# Patient Record
Sex: FEMALE | Race: WHITE | URBAN - METROPOLITAN AREA
[De-identification: names, ages, dates, MRNs, and addresses within clinical notes are randomized per-mention and may not be internally consistent; named-entity substitution may affect disease eponyms.]

---

## 2017-03-22 ENCOUNTER — OUTPATIENT (OUTPATIENT)
Dept: OUTPATIENT SERVICES | Facility: HOSPITAL | Age: 57
LOS: 1 days | Discharge: HOME | End: 2017-03-22

## 2017-06-27 DIAGNOSIS — Z12.31 ENCOUNTER FOR SCREENING MAMMOGRAM FOR MALIGNANT NEOPLASM OF BREAST: ICD-10-CM

## 2018-03-26 ENCOUNTER — OUTPATIENT (OUTPATIENT)
Dept: OUTPATIENT SERVICES | Facility: HOSPITAL | Age: 58
LOS: 1 days | Discharge: HOME | End: 2018-03-26

## 2018-03-26 DIAGNOSIS — Z12.31 ENCOUNTER FOR SCREENING MAMMOGRAM FOR MALIGNANT NEOPLASM OF BREAST: ICD-10-CM

## 2019-04-20 ENCOUNTER — OUTPATIENT (OUTPATIENT)
Dept: OUTPATIENT SERVICES | Facility: HOSPITAL | Age: 59
LOS: 1 days | Discharge: HOME | End: 2019-04-20
Payer: MEDICAID

## 2019-04-20 DIAGNOSIS — Z12.31 ENCOUNTER FOR SCREENING MAMMOGRAM FOR MALIGNANT NEOPLASM OF BREAST: ICD-10-CM

## 2019-04-20 PROCEDURE — 77067 SCR MAMMO BI INCL CAD: CPT | Mod: 26

## 2019-04-20 PROCEDURE — 77063 BREAST TOMOSYNTHESIS BI: CPT | Mod: 26

## 2020-06-10 ENCOUNTER — OUTPATIENT (OUTPATIENT)
Dept: OUTPATIENT SERVICES | Facility: HOSPITAL | Age: 60
LOS: 1 days | Discharge: HOME | End: 2020-06-10
Payer: MEDICAID

## 2020-06-10 DIAGNOSIS — Z12.31 ENCOUNTER FOR SCREENING MAMMOGRAM FOR MALIGNANT NEOPLASM OF BREAST: ICD-10-CM

## 2020-06-10 PROCEDURE — 77063 BREAST TOMOSYNTHESIS BI: CPT | Mod: 26

## 2020-06-10 PROCEDURE — 77067 SCR MAMMO BI INCL CAD: CPT | Mod: 26

## 2021-06-16 ENCOUNTER — OUTPATIENT (OUTPATIENT)
Dept: OUTPATIENT SERVICES | Facility: HOSPITAL | Age: 61
LOS: 1 days | Discharge: HOME | End: 2021-06-16
Payer: MEDICAID

## 2021-06-16 DIAGNOSIS — Z12.31 ENCOUNTER FOR SCREENING MAMMOGRAM FOR MALIGNANT NEOPLASM OF BREAST: ICD-10-CM

## 2021-06-16 PROCEDURE — 77063 BREAST TOMOSYNTHESIS BI: CPT | Mod: 26

## 2021-06-16 PROCEDURE — 77067 SCR MAMMO BI INCL CAD: CPT | Mod: 26

## 2022-06-13 ENCOUNTER — OUTPATIENT (OUTPATIENT)
Dept: OUTPATIENT SERVICES | Facility: HOSPITAL | Age: 62
LOS: 1 days | Discharge: HOME | End: 2022-06-13
Payer: MEDICAID

## 2022-06-13 VITALS
HEART RATE: 70 BPM | DIASTOLIC BLOOD PRESSURE: 76 MMHG | TEMPERATURE: 98 F | HEIGHT: 67 IN | SYSTOLIC BLOOD PRESSURE: 145 MMHG | OXYGEN SATURATION: 100 % | WEIGHT: 205.03 LBS | RESPIRATION RATE: 18 BRPM

## 2022-06-13 DIAGNOSIS — Z01.818 ENCOUNTER FOR OTHER PREPROCEDURAL EXAMINATION: ICD-10-CM

## 2022-06-13 DIAGNOSIS — M20.41 OTHER HAMMER TOE(S) (ACQUIRED), RIGHT FOOT: ICD-10-CM

## 2022-06-13 DIAGNOSIS — Z98.890 OTHER SPECIFIED POSTPROCEDURAL STATES: Chronic | ICD-10-CM

## 2022-06-13 LAB
ALBUMIN SERPL ELPH-MCNC: 4.8 G/DL — SIGNIFICANT CHANGE UP (ref 3.5–5.2)
ALP SERPL-CCNC: 107 U/L — SIGNIFICANT CHANGE UP (ref 30–115)
ALT FLD-CCNC: 19 U/L — SIGNIFICANT CHANGE UP (ref 0–41)
ANION GAP SERPL CALC-SCNC: 14 MMOL/L — SIGNIFICANT CHANGE UP (ref 7–14)
APTT BLD: 31.5 SEC — SIGNIFICANT CHANGE UP (ref 27–39.2)
AST SERPL-CCNC: 22 U/L — SIGNIFICANT CHANGE UP (ref 0–41)
BASOPHILS # BLD AUTO: 0.07 K/UL — SIGNIFICANT CHANGE UP (ref 0–0.2)
BASOPHILS NFR BLD AUTO: 1.1 % — HIGH (ref 0–1)
BILIRUB SERPL-MCNC: 0.4 MG/DL — SIGNIFICANT CHANGE UP (ref 0.2–1.2)
BUN SERPL-MCNC: 15 MG/DL — SIGNIFICANT CHANGE UP (ref 10–20)
CALCIUM SERPL-MCNC: 9.7 MG/DL — SIGNIFICANT CHANGE UP (ref 8.5–10.1)
CHLORIDE SERPL-SCNC: 100 MMOL/L — SIGNIFICANT CHANGE UP (ref 98–110)
CO2 SERPL-SCNC: 26 MMOL/L — SIGNIFICANT CHANGE UP (ref 17–32)
CREAT SERPL-MCNC: 0.5 MG/DL — LOW (ref 0.7–1.5)
EGFR: 107 ML/MIN/1.73M2 — SIGNIFICANT CHANGE UP
EOSINOPHIL # BLD AUTO: 0.12 K/UL — SIGNIFICANT CHANGE UP (ref 0–0.7)
EOSINOPHIL NFR BLD AUTO: 1.9 % — SIGNIFICANT CHANGE UP (ref 0–8)
GLUCOSE SERPL-MCNC: 88 MG/DL — SIGNIFICANT CHANGE UP (ref 70–99)
HCT VFR BLD CALC: 43.3 % — SIGNIFICANT CHANGE UP (ref 37–47)
HGB BLD-MCNC: 13.9 G/DL — SIGNIFICANT CHANGE UP (ref 12–16)
IMM GRANULOCYTES NFR BLD AUTO: 0.3 % — SIGNIFICANT CHANGE UP (ref 0.1–0.3)
INR BLD: 1.01 RATIO — SIGNIFICANT CHANGE UP (ref 0.65–1.3)
LYMPHOCYTES # BLD AUTO: 1.84 K/UL — SIGNIFICANT CHANGE UP (ref 1.2–3.4)
LYMPHOCYTES # BLD AUTO: 28.4 % — SIGNIFICANT CHANGE UP (ref 20.5–51.1)
MCHC RBC-ENTMCNC: 26.5 PG — LOW (ref 27–31)
MCHC RBC-ENTMCNC: 32.1 G/DL — SIGNIFICANT CHANGE UP (ref 32–37)
MCV RBC AUTO: 82.5 FL — SIGNIFICANT CHANGE UP (ref 81–99)
MONOCYTES # BLD AUTO: 0.64 K/UL — HIGH (ref 0.1–0.6)
MONOCYTES NFR BLD AUTO: 9.9 % — HIGH (ref 1.7–9.3)
NEUTROPHILS # BLD AUTO: 3.78 K/UL — SIGNIFICANT CHANGE UP (ref 1.4–6.5)
NEUTROPHILS NFR BLD AUTO: 58.4 % — SIGNIFICANT CHANGE UP (ref 42.2–75.2)
NRBC # BLD: 0 /100 WBCS — SIGNIFICANT CHANGE UP (ref 0–0)
PLATELET # BLD AUTO: 235 K/UL — SIGNIFICANT CHANGE UP (ref 130–400)
POTASSIUM SERPL-MCNC: 3.9 MMOL/L — SIGNIFICANT CHANGE UP (ref 3.5–5)
POTASSIUM SERPL-SCNC: 3.9 MMOL/L — SIGNIFICANT CHANGE UP (ref 3.5–5)
PROT SERPL-MCNC: 7.4 G/DL — SIGNIFICANT CHANGE UP (ref 6–8)
PROTHROM AB SERPL-ACNC: 11.6 SEC — SIGNIFICANT CHANGE UP (ref 9.95–12.87)
RBC # BLD: 5.25 M/UL — SIGNIFICANT CHANGE UP (ref 4.2–5.4)
RBC # FLD: 14.5 % — SIGNIFICANT CHANGE UP (ref 11.5–14.5)
SODIUM SERPL-SCNC: 140 MMOL/L — SIGNIFICANT CHANGE UP (ref 135–146)
WBC # BLD: 6.47 K/UL — SIGNIFICANT CHANGE UP (ref 4.8–10.8)
WBC # FLD AUTO: 6.47 K/UL — SIGNIFICANT CHANGE UP (ref 4.8–10.8)

## 2022-06-13 PROCEDURE — 93010 ELECTROCARDIOGRAM REPORT: CPT

## 2022-06-13 PROCEDURE — 71046 X-RAY EXAM CHEST 2 VIEWS: CPT | Mod: 26

## 2022-06-13 NOTE — H&P PST ADULT - HISTORY OF PRESENT ILLNESS
Patient is a 61 year old male presenting to PAST in preparation for left cavus foot reconstruction including multiple tarsal bone osteotomies, peroneal tendon transfer, multiple metatarsal osteotomies with hammer toe correction one , two three four five, with hardware fixation and pop block on 6/27 under gen anesthesia by Dr. sorensen  Pt reports she has pain to b/ll feet . She will eventually have the right foot done as weel. States the pain is 5-7/10. She reports that althoug the feet hurt her she continues to stay active but rests when she finishes task  PATIENT CURRENTLY DENIES CHEST PAIN  SHORTNESS OF BREATH  PALPITATIONS,  DYSURIA, OR UPPER RESPIRATORY INFECTION IN PAST 2 WEEKS  EXERCISE  TOLERANCE  1-2 FLIGHT OF STAIRS  WITHOUT SHORTNESS OF BREATH    Anesthesia Alert  NO--Difficult Airway  NO--History of neck surgery or radiation  NO--Limited ROM of neck  NO--History of Malignant hyperthermia  NO--Personal or family history of Pseudocholinesterase deficiency  NO--Prior Anesthesia Complication  NO--Latex Allergy  NO--Loose teeth  NO--History of Rheumatoid Arthritis  NO--SLICK  NO-- BLEEDING RISK  NO--Other_____    As per patient, this is their complete medical and surgical history, including medications both prescribed or over the counter.  Patient verbalized understanding of instructions and was given the opportunity to ask questions and have them answered.  Patient denies any signs or symptoms of COVID 19 and denies contact with known positive individuals.  They have an appointment for  COVID testing pre-procedure and acknowledge its time and place.  They were instructed to quarantine pre-procedure, practice exposure control measures, continue to self-monitor and report any concerns to their proceduralist.

## 2022-06-13 NOTE — H&P PST ADULT - SPIRITUAL CULTURAL, CURRENT SITUATION, PROFILE
Impression: Age-related hypermature cataract of right eye: H25.21. Plan: refer for  cat claudette, pt had Bscan done in April and post seg was normal according to pt. Records will be scanned.
Impression: Blindness in one eye: H54.40.  Plan: complications from cataract surgery OS
n

## 2022-06-21 ENCOUNTER — OUTPATIENT (OUTPATIENT)
Dept: OUTPATIENT SERVICES | Facility: HOSPITAL | Age: 62
LOS: 1 days | Discharge: HOME | End: 2022-06-21
Payer: MEDICAID

## 2022-06-21 DIAGNOSIS — Z98.890 OTHER SPECIFIED POSTPROCEDURAL STATES: Chronic | ICD-10-CM

## 2022-06-21 DIAGNOSIS — Z12.31 ENCOUNTER FOR SCREENING MAMMOGRAM FOR MALIGNANT NEOPLASM OF BREAST: ICD-10-CM

## 2022-06-21 PROBLEM — H40.9 UNSPECIFIED GLAUCOMA: Chronic | Status: ACTIVE | Noted: 2022-06-13

## 2022-06-21 PROBLEM — E78.00 PURE HYPERCHOLESTEROLEMIA, UNSPECIFIED: Chronic | Status: ACTIVE | Noted: 2022-06-13

## 2022-06-21 PROBLEM — I10 ESSENTIAL (PRIMARY) HYPERTENSION: Chronic | Status: ACTIVE | Noted: 2022-06-13

## 2022-06-21 PROCEDURE — 77067 SCR MAMMO BI INCL CAD: CPT | Mod: 26

## 2022-06-21 PROCEDURE — 77063 BREAST TOMOSYNTHESIS BI: CPT | Mod: 26

## 2022-06-27 ENCOUNTER — TRANSCRIPTION ENCOUNTER (OUTPATIENT)
Age: 62
End: 2022-06-27

## 2022-06-27 ENCOUNTER — RESULT REVIEW (OUTPATIENT)
Age: 62
End: 2022-06-27

## 2022-06-27 ENCOUNTER — INPATIENT (INPATIENT)
Facility: HOSPITAL | Age: 62
LOS: 0 days | Discharge: HOME | End: 2022-06-28
Attending: INTERNAL MEDICINE | Admitting: INTERNAL MEDICINE
Payer: MEDICAID

## 2022-06-27 VITALS
OXYGEN SATURATION: 100 % | TEMPERATURE: 99 F | SYSTOLIC BLOOD PRESSURE: 151 MMHG | HEIGHT: 67 IN | WEIGHT: 205.03 LBS | DIASTOLIC BLOOD PRESSURE: 70 MMHG | RESPIRATION RATE: 17 BRPM | HEART RATE: 72 BPM

## 2022-06-27 DIAGNOSIS — Z98.890 OTHER SPECIFIED POSTPROCEDURAL STATES: Chronic | ICD-10-CM

## 2022-06-27 PROCEDURE — 73620 X-RAY EXAM OF FOOT: CPT | Mod: 26,RT

## 2022-06-27 PROCEDURE — 99221 1ST HOSP IP/OBS SF/LOW 40: CPT

## 2022-06-27 PROCEDURE — 88304 TISSUE EXAM BY PATHOLOGIST: CPT | Mod: 26

## 2022-06-27 PROCEDURE — 88311 DECALCIFY TISSUE: CPT | Mod: 26

## 2022-06-27 RX ORDER — SIMVASTATIN 20 MG/1
20 TABLET, FILM COATED ORAL AT BEDTIME
Refills: 0 | Status: DISCONTINUED | OUTPATIENT
Start: 2022-06-27 | End: 2022-06-28

## 2022-06-27 RX ORDER — LEVOTHYROXINE SODIUM 125 MCG
75 TABLET ORAL
Refills: 0 | Status: DISCONTINUED | OUTPATIENT
Start: 2022-06-27 | End: 2022-06-28

## 2022-06-27 RX ORDER — HYDROCHLOROTHIAZIDE 25 MG
25 TABLET ORAL DAILY
Refills: 0 | Status: DISCONTINUED | OUTPATIENT
Start: 2022-06-27 | End: 2022-06-28

## 2022-06-27 RX ORDER — LATANOPROST 0.05 MG/ML
1 SOLUTION/ DROPS OPHTHALMIC; TOPICAL AT BEDTIME
Refills: 0 | Status: DISCONTINUED | OUTPATIENT
Start: 2022-06-27 | End: 2022-06-28

## 2022-06-27 RX ORDER — ACETAMINOPHEN 500 MG
650 TABLET ORAL EVERY 6 HOURS
Refills: 0 | Status: DISCONTINUED | OUTPATIENT
Start: 2022-06-27 | End: 2022-06-28

## 2022-06-27 RX ORDER — METOPROLOL TARTRATE 50 MG
25 TABLET ORAL DAILY
Refills: 0 | Status: DISCONTINUED | OUTPATIENT
Start: 2022-06-27 | End: 2022-06-28

## 2022-06-27 RX ORDER — SODIUM CHLORIDE 9 MG/ML
1000 INJECTION, SOLUTION INTRAVENOUS
Refills: 0 | Status: DISCONTINUED | OUTPATIENT
Start: 2022-06-27 | End: 2022-06-28

## 2022-06-27 RX ORDER — HYDROMORPHONE HYDROCHLORIDE 2 MG/ML
0.5 INJECTION INTRAMUSCULAR; INTRAVENOUS; SUBCUTANEOUS EVERY 4 HOURS
Refills: 0 | Status: DISCONTINUED | OUTPATIENT
Start: 2022-06-27 | End: 2022-06-28

## 2022-06-27 RX ORDER — HYDROMORPHONE HYDROCHLORIDE 2 MG/ML
0.5 INJECTION INTRAMUSCULAR; INTRAVENOUS; SUBCUTANEOUS ONCE
Refills: 0 | Status: DISCONTINUED | OUTPATIENT
Start: 2022-06-27 | End: 2022-06-28

## 2022-06-27 RX ADMIN — SODIUM CHLORIDE 100 MILLILITER(S): 9 INJECTION, SOLUTION INTRAVENOUS at 22:20

## 2022-06-27 NOTE — BRIEF OPERATIVE NOTE - NSICDXBRIEFPOSTOP_GEN_ALL_CORE_FT
POST-OP DIAGNOSIS:  Hammer toe of right foot 27-Jun-2022 22:16:07 Right foot pes cavus w/ hammer toe 1-5 Margaret Lewis  Pes cavus 27-Jun-2022 22:16:23 Right foot pes cavus w/ hammer toe 1-5 Margaret Lewis

## 2022-06-27 NOTE — BRIEF OPERATIVE NOTE - COMMENTS
Pt will be admitted for a day for post op monitoring and pain control; Per attending, request 900 mg Clinda q8 twice dose for post op abx;

## 2022-06-27 NOTE — PACU DISCHARGE NOTE - COMMENTS
61 y o female S/P Right Cavus Reconstruction, Multiple Tarsal Bone Osteotomies, Multiple Metatarsal Osteotomies, Hammertoe Correction, 1, 2, 3, 4 Hardware Fixation. Right Popliteal Sciatic Block and IV Sedation without complications. VS /82 HR 80 RR 16 SAO2 97%. Pt tolerated procedure well.

## 2022-06-27 NOTE — H&P ADULT - HISTORY OF PRESENT ILLNESS
62yo female is admitted to medicine following extensive podiatry surgery for post op management  62yo female is admitted to medicine following extensive podiatry surgery for post op management. Currently feels well and wants to avoid heavy analgesics

## 2022-06-27 NOTE — BRIEF OPERATIVE NOTE - NSICDXBRIEFPREOP_GEN_ALL_CORE_FT
PRE-OP DIAGNOSIS:  Pes cavus 27-Jun-2022 22:15:40 Right foot pes cavus w/ hammer toe 1-5 Margaret Lewis  Hammer toe of right foot 27-Jun-2022 22:15:54 Right foot pes cavus w/ hammer toe 1-5 Margaret Lewis

## 2022-06-27 NOTE — H&P ADULT - ASSESSMENT
Post op management - for now analgesics prn, 2 doses of cleocin IV fluids and monitor  Post op management following podiatry surgery- for now analgesics prn, 2 doses of cleocin per podiatry request, IV fluids and monitor     HTN- monitor on current meds    HLD - zocor    hypothyroid -on replacement     Glaucoma - continue usual drops

## 2022-06-27 NOTE — BRIEF OPERATIVE NOTE - NSICDXBRIEFPROCEDURE_GEN_ALL_CORE_FT
PROCEDURES:  Plantar fasciotomy 27-Jun-2022 22:08:41 Right foot pes cavus w/ hammer toe 1-5 Margaret Lewis  Osteotomy, calcaneus, Bhardwaj 27-Jun-2022 22:08:53 Right foot pes cavus w/ hammer toe 1-5 Debbie, Margaret Dunne  Plantarflexory osteotomy 27-Jun-2022 22:09:01 Right foot pes cavus w/ hammer toe 1-5 Debbie, Margaret Dunne  Luna procedure, great toe 27-Jun-2022 22:09:25 Right foot pes cavus w/ hammer toe 1-5 Debbie, Margaret Dunne  Arthrodesis of third toe 27-Jun-2022 22:09:45 Right foot pes cavus w/ hammer toe 1-5 Debbie, Margaret Tarustam  Arthrodesis of fourth toe 27-Jun-2022 22:09:53 Right foot pes cavus w/ hammer toe 1-5 Debbie, Sudheeri Tae  Arthrodesis of second toe 27-Jun-2022 22:10:01 Right foot pes cavus w/ hammer toe 1-5 Debbie, Margaret Tae  Arthrodesis of right great toe 27-Jun-2022 22:10:32 Right foot pes cavus w/ hammer toe 1-5 Debbie, Margaret Dunne  Arthroplasty of toe of right foot 27-Jun-2022 22:13:22 Right foot pes cavus w/ hammer toe 1-5 Debbie, Margaret Dunne

## 2022-06-27 NOTE — CHART NOTE - NSCHARTNOTEFT_GEN_A_CORE
PACU ANESTHESIA ADMISSION NOTE      Procedure: Right Cavus Foot Reconstruction, Multiple Tarsal Bone Osteotomies, Multiple Metatarsal Osteotomies with Hammertoe Correction, 1, 2, 3, 4 Hardware Fixation  Post op diagnosis: Right Foot Multiple Hammertoes    ____  Intubated  TV:______       Rate: ______      FiO2: ______    __x__  Patent Airway    ___x_  Full return of protective reflexes    __x__  Full recovery from anesthesia / back to baseline     Vitals:   T: 98          R:  16                BP:   116/61               Sat: 98%                   P: 90      Mental Status:  __x__ Awake   ___x__ Alert   _____ Drowsy   _____ Sedated    Nausea/Vomiting:  __x__ NO  ______Yes,   See Post - Op Orders          Pain Scale (0-10):  _____    Treatment: ____ None    __x__ See Post - Op/PCA Orders    Post - Operative Fluids:   ____ Oral   __x__ See Post - Op Orders    Plan: Discharge:   ____Home       __x___Floor     _____Critical Care    _____  Other:_________________

## 2022-06-28 ENCOUNTER — TRANSCRIPTION ENCOUNTER (OUTPATIENT)
Age: 62
End: 2022-06-28

## 2022-06-28 VITALS
TEMPERATURE: 98 F | SYSTOLIC BLOOD PRESSURE: 100 MMHG | RESPIRATION RATE: 16 BRPM | HEART RATE: 78 BPM | DIASTOLIC BLOOD PRESSURE: 64 MMHG

## 2022-06-28 LAB
ALBUMIN SERPL ELPH-MCNC: 4.5 G/DL — SIGNIFICANT CHANGE UP (ref 3.5–5.2)
ALP SERPL-CCNC: 95 U/L — SIGNIFICANT CHANGE UP (ref 30–115)
ALT FLD-CCNC: 19 U/L — SIGNIFICANT CHANGE UP (ref 0–41)
ANION GAP SERPL CALC-SCNC: 14 MMOL/L — SIGNIFICANT CHANGE UP (ref 7–14)
AST SERPL-CCNC: 24 U/L — SIGNIFICANT CHANGE UP (ref 0–41)
BILIRUB SERPL-MCNC: 0.6 MG/DL — SIGNIFICANT CHANGE UP (ref 0.2–1.2)
BUN SERPL-MCNC: 15 MG/DL — SIGNIFICANT CHANGE UP (ref 10–20)
CALCIUM SERPL-MCNC: 9.6 MG/DL — SIGNIFICANT CHANGE UP (ref 8.5–10.1)
CHLORIDE SERPL-SCNC: 100 MMOL/L — SIGNIFICANT CHANGE UP (ref 98–110)
CO2 SERPL-SCNC: 24 MMOL/L — SIGNIFICANT CHANGE UP (ref 17–32)
CREAT SERPL-MCNC: 0.6 MG/DL — LOW (ref 0.7–1.5)
EGFR: 103 ML/MIN/1.73M2 — SIGNIFICANT CHANGE UP
GLUCOSE SERPL-MCNC: 111 MG/DL — HIGH (ref 70–99)
HCT VFR BLD CALC: 39.7 % — SIGNIFICANT CHANGE UP (ref 37–47)
HGB BLD-MCNC: 13.2 G/DL — SIGNIFICANT CHANGE UP (ref 12–16)
MCHC RBC-ENTMCNC: 27.3 PG — SIGNIFICANT CHANGE UP (ref 27–31)
MCHC RBC-ENTMCNC: 33.2 G/DL — SIGNIFICANT CHANGE UP (ref 32–37)
MCV RBC AUTO: 82.2 FL — SIGNIFICANT CHANGE UP (ref 81–99)
NRBC # BLD: 0 /100 WBCS — SIGNIFICANT CHANGE UP (ref 0–0)
PLATELET # BLD AUTO: 269 K/UL — SIGNIFICANT CHANGE UP (ref 130–400)
POTASSIUM SERPL-MCNC: 3.9 MMOL/L — SIGNIFICANT CHANGE UP (ref 3.5–5)
POTASSIUM SERPL-SCNC: 3.9 MMOL/L — SIGNIFICANT CHANGE UP (ref 3.5–5)
PROT SERPL-MCNC: 7 G/DL — SIGNIFICANT CHANGE UP (ref 6–8)
RBC # BLD: 4.83 M/UL — SIGNIFICANT CHANGE UP (ref 4.2–5.4)
RBC # FLD: 14.5 % — SIGNIFICANT CHANGE UP (ref 11.5–14.5)
SODIUM SERPL-SCNC: 138 MMOL/L — SIGNIFICANT CHANGE UP (ref 135–146)
WBC # BLD: 14.35 K/UL — HIGH (ref 4.8–10.8)
WBC # FLD AUTO: 14.35 K/UL — HIGH (ref 4.8–10.8)

## 2022-06-28 PROCEDURE — 99239 HOSP IP/OBS DSCHRG MGMT >30: CPT

## 2022-06-28 RX ORDER — ACETAMINOPHEN 500 MG
2 TABLET ORAL
Qty: 0 | Refills: 0 | DISCHARGE
Start: 2022-06-28

## 2022-06-28 RX ADMIN — Medication 100 MILLIGRAM(S): at 00:44

## 2022-06-28 RX ADMIN — Medication 100 MILLIGRAM(S): at 14:31

## 2022-06-28 RX ADMIN — Medication 75 MICROGRAM(S): at 06:00

## 2022-06-28 RX ADMIN — Medication 100 MILLIGRAM(S): at 08:45

## 2022-06-28 RX ADMIN — Medication 25 MILLIGRAM(S): at 05:55

## 2022-06-28 RX ADMIN — Medication 25 MILLIGRAM(S): at 05:54

## 2022-06-28 NOTE — CONSULT NOTE ADULT - ASSESSMENT
IMPRESSION: Rehab of 61 y.o f rehab  for gd sp rt foot sx  hummer toe repair     PRECAUTIONS: [  ] Cardiac  [  ] Respiratory  [  ] Seizures [  ] Contact Isolation  [  ] Droplet Isolation  [ FALL ] Other    Weight Bearing Status:     RECOMMENDATION:    Out of Bed to Chair     DVT/Decubiti Prophylaxis    REHAB PLAN:     [  x ] Bedside P/T 3-5 times a week   [   ]   Bedside O/T  2-3 times a week             [   ] No Rehab Therapy Indicated                   [   ]  Speech Therapy   Conditioning/ROM                                    ADL  Bed Mobility                                               Conditioning/ROM  Transfers                                                     Bed Mobility  Sitting /Standing Balance                         Transfers                                        Gait Training                                               Sitting/Standing Balance  Stair Training [   ]Applicable                    Home equipment Eval                                                                        Splinting  [   ] Only      GOALS:   ADL   [ x  ]   Independent                    Transfers  [x   ] Independent                          Ambulation  [ x  ] Independent     [ x   ] With device                            [   ]  CG                                                         [   ]  CG                                                                  [   ] CG                            [    ] Min A                                                   [   ] Min A                                                              [   ] Min  A          DISCHARGE PLAN:   [   ]  Good candidate for Intensive Rehabilitation/Hospital based-[facility]                                             Will tolerate 3hrs Intensive Rehab Daily                                       [    ]  Short Term Rehab in Skilled Nursing Facility                                       [   xx ]  Home with Outpatient or VN services                                         [    ]  Possible Candidate for Intensive Hospital based Rehab

## 2022-06-28 NOTE — PROGRESS NOTE ADULT - SUBJECTIVE AND OBJECTIVE BOX
Podiatry Progress Note    Subjective:   GALA FOWLER is a pleasant well-nourished, well developed 61y Female in no acute distress, alert awake, and oriented to person, place and time.  Patient is a 61y old  Female who presents with a chief complaint of Right Foot Sx. Pt resting well in recliner; States she is happy w/results of Sx; No new pedal complaints.    PAST MEDICAL & SURGICAL HISTORY:  HTN (hypertension)      Hypercholesteremia      Glaucoma      History of surgery  tonsillectomy           Objective:  Vital Signs Last 24 Hrs  T(C): 36.7 (28 Jun 2022 12:53), Max: 36.7 (27 Jun 2022 21:57)  T(F): 98.1 (28 Jun 2022 12:53), Max: 98.1 (28 Jun 2022 04:30)  HR: 78 (28 Jun 2022 12:53) (71 - 88)  BP: 100/64 (28 Jun 2022 12:53) (100/64 - 144/84)  BP(mean): --  RR: 16 (28 Jun 2022 12:53) (16 - 20)  SpO2: 98% (28 Jun 2022 06:00) (96% - 99%)                        13.2   14.35 )-----------( 269      ( 28 Jun 2022 12:04 )             39.7                 06-28    138  |  100  |  15  ----------------------------<  111<H>  3.9   |  24  |  0.6<L>    Ca    9.6      28 Jun 2022 12:04    TPro  7.0  /  Alb  4.5  /  TBili  0.6  /  DBili  x   /  AST  24  /  ALT  19  /  AlkPhos  95  06-28        Assessment:  s/p R foot pes cavus and hammer toe 1-5 correction; Plantar fasciotomy, Bhardwaj procedure, Plantarflexory osteotomy, Ramón and Jeremy tendon transfer, arthrodesis of toe 1-4 w/ implants, Z-skinplasty of right 5th toe;     Plan:  Chart reviewed and Patient evaluated. All Questions and Concerns Addressed and Answered  Discussed diagnosis and treatment with patient  s/p R foot pes cavus and hammer toe 1-5 correction; Plantar fasciotomy, Bhardwaj procedure, Plantarflexory osteotomy, Ramón and Jeremy tendon transfer, arthrodesis of toe 1-4 w/ implants, Z-skinplasty of right 5th toe;   Pt needs to be monitor for a day for pain control and post op monitoring per attending;   Dr. Malone requesting Clindamycin 900 mg q8, two dose for post op abx; Abx completed 6/28/22;  Patient Stable for DSC from Podiatry Standpoint;  Patient can f/u w/Dr. Malone in o/p Podiatry Clinic 1 week post DSC;  No further intervention per Podiatry;  Weight bearing status; Strict Non-weight bearing Right Foot;  Discussed Plan w/ Dr. Malone;       Podiatry      Podiatry Progress Note    Subjective:   GALA FOWLER is a pleasant well-nourished, well developed 61y Female in no acute distress, alert awake, and oriented to person, place and time.  Patient is a 61y old  Female who presents with a chief complaint of Right Foot Sx. Pt resting well in recliner; States she is happy w/results of Sx; No new pedal complaints. Dressings D/C/I.     PAST MEDICAL & SURGICAL HISTORY:  HTN (hypertension)      Hypercholesteremia      Glaucoma      History of surgery  tonsillectomy           Objective:  Vital Signs Last 24 Hrs  T(C): 36.7 (28 Jun 2022 12:53), Max: 36.7 (27 Jun 2022 21:57)  T(F): 98.1 (28 Jun 2022 12:53), Max: 98.1 (28 Jun 2022 04:30)  HR: 78 (28 Jun 2022 12:53) (71 - 88)  BP: 100/64 (28 Jun 2022 12:53) (100/64 - 144/84)  BP(mean): --  RR: 16 (28 Jun 2022 12:53) (16 - 20)  SpO2: 98% (28 Jun 2022 06:00) (96% - 99%)                        13.2   14.35 )-----------( 269      ( 28 Jun 2022 12:04 )             39.7                 06-28    138  |  100  |  15  ----------------------------<  111<H>  3.9   |  24  |  0.6<L>    Ca    9.6      28 Jun 2022 12:04    TPro  7.0  /  Alb  4.5  /  TBili  0.6  /  DBili  x   /  AST  24  /  ALT  19  /  AlkPhos  95  06-28        Assessment:  s/p R foot pes cavus and hammer toe 1-5 correction; Plantar fasciotomy, Bhardwaj procedure, Plantarflexory osteotomy, Ramón and Luna tendon transfer, arthrodesis of toe 1-4 w/ implants, Z-skinplasty of right 5th toe;     Plan:  Chart reviewed and Patient evaluated. All Questions and Concerns Addressed and Answered  Discussed diagnosis and treatment with patient  s/p R foot pes cavus and hammer toe 1-5 correction; Plantar fasciotomy, Bhardwaj procedure, Plantarflexory osteotomy, Ramón and Luna tendon transfer, arthrodesis of toe 1-4 w/ implants, Z-skinplasty of right 5th toe;   Pt needs to be monitor for a day for pain control and post op monitoring per attending;   Dr. Malone requesting Clindamycin 900 mg q8, two dose for post op abx; Abx completed 6/28/22;  Patient Stable for DSC from Podiatry Standpoint;  Patient can f/u w/Dr. Malone in o/p Podiatry Clinic 1 week post DSC;  No further intervention per Podiatry;  Weight bearing status; Strict Non-weight bearing Right Foot;  Discussed Plan w/ Dr. Malone;       Podiatry

## 2022-06-28 NOTE — CONSULT NOTE ADULT - SUBJECTIVE AND OBJECTIVE BOX
Podiatry Consult Note    Subjective:  GALA FOWLER is a 61y old  Female who presents with a chief complaint of s/p R foot pes cavus and hammer toe 1-5 correction;   HPI:  Pt f/u w/ Dr. Malone, s/p R foot pes cavus and hammer toe 1-5 correction; pt needs to be monitored for a day for post op abx and pain control, Dr. Malone requested pt to be admitted to [facility] for a day;     Past Medical History and Surgical History  PAST MEDICAL & SURGICAL HISTORY:  HTN (hypertension)  Hypercholesteremia  Glaucoma  History of surgery  tonsillectomy    Objective:  Vital Signs Last 24 Hrs  T(C): 36.7 (27 Jun 2022 21:57), Max: 37.1 (27 Jun 2022 13:55)  T(F): 98 (27 Jun 2022 21:57), Max: 98.7 (27 Jun 2022 13:55)  HR: 76 (27 Jun 2022 22:07) (72 - 88)  BP: 144/84 (27 Jun 2022 22:07) (116/61 - 151/70)  BP(mean): --  RR: 20 (27 Jun 2022 22:07) (17 - 20)  SpO2: 98% (27 Jun 2022 22:07) (96% - 100%)                Assessment:  s/p R foot pes cavus and hammer toe 1-5 correction; Plantar fasciotomy, Bhardwaj procedure, Plantarflexory osteotomy, Ramón and Luna tendon transfer, arthrodesis of toe 1-4 w/ implants, Z-skinplasty of right 5th toe;     Plan:  Chart reviewed and Patient evaluated. All Questions and Concerns Addressed and Answered  Discussed diagnosis and treatment with patient  s/p R foot pes cavus and hammer toe 1-5 correction; Plantar fasciotomy, Bhardwaj procedure, Plantarflexory osteotomy, Ramón and Luna tendon transfer, arthrodesis of toe 1-4 w/ implants, Z-skinplasty of right 5th toe;   Pt needs to be monitor for a day for pain control and post op monitoring per attending;   Dr. Malone requesting Clindamycin 900 mg q8, two dose for post op abx;   Per Dr. Malone, pt is stable to be discharge home once pt receives Clindamycin 900mg q8 two dose;   Weight bearing status; strict non-weight bearing to right foot;   Discussed Plan w/ Dr. Malone;     Podiatry       
HPI: 62 yo female is admitted to medicine following extensive podiatry surgery for post op management. Currently feels well and wants to avoid heavy analgesics ptn referred  to  acute  rehab  for eval and  tx  ptn seen and exam at  bed  side pleasant  to  talk  to  her no new c.o  aox3     PTN  REFERRED TO ACUTE  REHAB  FOR  EVAL AND  TX   PAST MEDICAL & SURGICAL HISTORY:  HTN (hypertension)      Hypercholesteremia      Glaucoma      History of surgery  tonsillectomy          Hospital Course:    TODAY'S SUBJECTIVE & REVIEW OF SYMPTOMS:     Constitutional WNL   Cardio WNL   Resp WNL   GI WNL  Heme WNL  Endo WNL  Skin WNL  MSK WNL  Neuro WNL  Cognitive WNL  Psych WNL      MEDICATIONS  (STANDING):  clindamycin IVPB      clindamycin IVPB 900 milliGRAM(s) IV Intermittent every 8 hours  hydrochlorothiazide 25 milliGRAM(s) Oral daily  lactated ringers. 1000 milliLiter(s) (100 mL/Hr) IV Continuous <Continuous>  latanoprost 0.005% Ophthalmic Solution 1 Drop(s) Both EYES at bedtime  levothyroxine 75 MICROGram(s) Oral <User Schedule>  metoprolol succinate ER 25 milliGRAM(s) Oral daily  simvastatin 20 milliGRAM(s) Oral at bedtime    MEDICATIONS  (PRN):  acetaminophen     Tablet .. 650 milliGRAM(s) Oral every 6 hours PRN Mild Pain (1 - 3)  HYDROmorphone  Injectable 0.5 milliGRAM(s) IV Push once PRN Severe Pain (7 - 10)  HYDROmorphone  Injectable 0.5 milliGRAM(s) IV Push every 4 hours PRN Moderate Pain (4 - 6)      FAMILY HISTORY:  Known health problems: none (Father, Mother)        Allergies    penicillins (Rash)    Intolerances        SOCIAL HISTORY:    [  ] Etoh  [  ] Smoking  [  ] Substance abuse     Home Environment:  [  ] Home Alone  [ x ] Lives with Family  [  ] Home Health Aid    Dwelling:  [  ] Apartment  [x  ] Private House  [  ] Adult Home  [  ] Skilled Nursing Facility      [  ] Short Term  [  ] Long Term  [ x ] Stairs       Elevator [  ]    FUNCTIONAL STATUS PTA: (Check all that apply)  Ambulation: [ x  ]Independent    [  ] Dependent     [  ] Non-Ambulatory  Assistive Device: [  ] SA Cane  [  ]  Q Cane  [  ] Walker  [  ]  Wheelchair  ADL : [ x ] Independent  [  ]  Dependent       Vital Signs Last 24 Hrs  T(C): 36.7 (28 Jun 2022 04:30), Max: 37.1 (27 Jun 2022 13:55)  T(F): 98.1 (28 Jun 2022 04:30), Max: 98.7 (27 Jun 2022 13:55)  HR: 76 (28 Jun 2022 04:30) (71 - 88)  BP: 125/64 (28 Jun 2022 04:30) (116/61 - 151/70)  BP(mean): --  RR: 17 (28 Jun 2022 04:30) (17 - 20)  SpO2: 98% (28 Jun 2022 06:00) (96% - 100%)      PHYSICAL EXAM: Alert & Oriented X3  GENERAL: NAD, well-groomed, well-developed  HEAD:  Atraumatic, Normocephalic  EYES: EOMI, PERRLA, conjunctiva and sclera clear  NECK: Supple, No JVD, Normal thyroid  CHEST/LUNG: Clear to percussion bilaterally; No rales, rhonchi, wheezing, or rubs  HEART: Regular rate and rhythm; No murmurs, rubs, or gallops  ABDOMEN: Soft, Nontender, Nondistended; Bowel sounds present  EXTREMITIES:  2+ Peripheral Pulses, No clubbing, cyanosis, or edema    NERVOUS SYSTEM:  Cranial Nerves 2-12 intact [ x ] Abnormal  [  ]  ROM: WFL all extremities [ x ]  Abnormal [  ]  Motor Strength: WFL all extremities  [  ]  Abnormal [rt  le  in  caste   ]  Sensation: intact to light touch [x  ] Abnormal [  ]  Reflexes: Symmetric [x  ]  Abnormal [  ]    FUNCTIONAL STATUS:  Bed Mobility: Independent [  ]  Supervision [  ]  Needs Assistance [ x ]  N/A [  ]  Transfers: Independent [  ]  Supervision [  ]  Needs Assistance [x  ]  N/A [  ]   Ambulation: Independent [  ]  Supervision [  ]  Needs Assistance [x  ]  N/A [  ]  ADL: Independent [  ] Requires Assistance [  ] N/A [x ]  SEE PT/OT IE NOTES    LABS:                RADIOLOGY & ADDITIONAL STUDIES:    Assesment:

## 2022-06-28 NOTE — DISCHARGE NOTE PROVIDER - CARE PROVIDER_API CALL
Donovan Grover   MEDICINE  [address] [address]  Phone: [phone]  Fax: [phone]  Follow Up Time: 1 week    Hernandez Malone  SURGERY  [address] [place] [id number]  Phone: [phone]  Fax: [phone]  Follow Up Time: 2 weeks

## 2022-06-28 NOTE — DISCHARGE NOTE PROVIDER - NSDCCPCAREPLAN_GEN_ALL_CORE_FT
PRINCIPAL DISCHARGE DIAGNOSIS  Diagnosis: Hammertoe of right foot  Assessment and Plan of Treatment: Patient is 60 y/o F with PMHx of HTN, HLD, hypothyroid who had presented for right foot hammertoe correction by podiatry. Patient is now s/p s/p R foot pes cavus and hammer toe 1-5 correction; Plantar fasciotomy, Bhardwaj procedure, Plantarflexory osteotomy, Ramón and Luna tendon transfer, arthrodesis of toe 1-4 w/ implants, Z-skinplasty of right 5th toe; Patient was admitted for overnight monitoring for post op abx and pain control. Patient was evaluated by podiatrist. Patient received 2 doses of IV clindamycin. Patient was cleared by podiatrist for hospital discharge after receiving 2nd dose of IV antibiotics. At this time patient is medically stable for a hospital discharge.  Things to follow up:  -PCP follow up in 1-2 weeks  -Follow up with Dr. Malone on July 11th   -Weight bearing status; strict non-weight bearing to right foot

## 2022-06-28 NOTE — DISCHARGE NOTE PROVIDER - PROVIDER TOKENS
PROVIDER:[TOKEN:[[id number]],FOLLOWUP:[1 week]],PROVIDER:[TOKEN:[[id number]],FOLLOWUP:[2 weeks]]

## 2022-06-28 NOTE — PHYSICAL THERAPY INITIAL EVALUATION ADULT - GENERAL OBSERVATIONS, REHAB EVAL
9:10-9:30 Chart reviewed. Patient available to be seen for physical therapy, denies pain, confirmed with RN.  Pt rec;d in bed +IV, +R foot splinted and Ace wrapped, pt in NAD.

## 2022-06-28 NOTE — DISCHARGE NOTE NURSING/CASE MANAGEMENT/SOCIAL WORK - PATIENT PORTAL LINK FT
You can access the FollowMyHealth Patient Portal offered by [facility] by registering at the following website: [email or web address]. By joining [employer]’s FollowMyHealth portal, you will also be able to view your health information using other applications (apps) compatible with our system.

## 2022-06-28 NOTE — DISCHARGE NOTE NURSING/CASE MANAGEMENT/SOCIAL WORK - NSDCPEFALRISK_GEN_ALL_CORE
For information on Fall & Injury Prevention, visit: [email or web address] OR  [email or web address] OR  https://www.cdc.gov/steadi/patient.html

## 2022-06-28 NOTE — PHYSICAL THERAPY INITIAL EVALUATION ADULT - LEVEL OF INDEPENDENCE: STAIR NEGOTIATION, REHAB EVAL
Pt reports her neighbor is going to be getting her into the house, left message to discuss process with Michael (neighbor).

## 2022-06-28 NOTE — PHYSICAL THERAPY INITIAL EVALUATION ADULT - ADDITIONAL COMMENTS
Pt reports she lives with mother in house with 5 RUPESH, and bedroom set up in living room to avoid going up to bedroom.

## 2022-06-28 NOTE — DISCHARGE NOTE PROVIDER - NSDCHHPTASSISTHOME_GEN_ALL_CORE
-We hope this OMT session helped you today  -Please may sure you drink plenty of water today, you may feel some soreness in the next feel days for which you can take over the counter NSAIDs such as ibuprofen  -Remember it is what you do yourself that will ultimately help your body and your pain the most. Pain is a natural part of the human body but we can help reduce pain by following the instructions below. Our patients who follow these instructions are able to get their pain under control much better than those who do not.  -Continue stretching and exercises at home, obtain a gym membership if that will help promote exercise. Your goal is a minimum of 150 minutes of physical activity a week.   -Promote good form and posture by limiting screen time. Get at least 8-10 hours of sleep a day.  -Maintain hydration of at least 8 cups of water a day, avoid drinking sugar sweetened beverages and caffeinated drinks  -Limit tobacco use, alcohol use, and reactional/ilicit drug use  -Eat a healthy diet of 3 meals or 5 smaller meals a days with minimal snacking. Avoid overeating when you are full and foods with empty calories such as processed foods. Avoid excess intake of carbohydrates and fried foods  -If the pain is still bad and not improving, please make sure you are following all instructions listed here. Please also keep a pain diary writing down when the pain occurs, what you were doing at the time, what made the pain better and worse, how bad it was, and any additional information you feel is important. Bring it to your next visit at OMT or your primary care doctor so we can look for patterns to help address your pain better.     Followup in 4-6 weeks for further OMT sessions if patient desires.or followup sooner as needed     Patient Needs Assistance to Leave Residence...

## 2022-06-28 NOTE — DISCHARGE NOTE PROVIDER - HOSPITAL COURSE
Patient is 62 y/o F with PMHx of HTN, HLD, hypothyroid who had presented for right foot hammertoe correction by podiatry. Patient is now s/p s/p R foot pes cavus and hammer toe 1-5 correction; Plantar fasciotomy, Bhardwaj procedure, Plantarflexory osteotomy, Ramón and Luna tendon transfer, arthrodesis of toe 1-4 w/ implants, Z-skinplasty of right 5th toe; Patient was admitted for overnight monitoring for post op abx and pain control. Patient was evaluated by podiatrist. Patient received 2 doses of IV clindamycin. Patient was cleared by podiatrist for hospital discharge after receiving 2nd dose of IV antibiotics. At this time patient is medically stable for a hospital discharge.    Things to follow up:  -PCP follow up in 1-2 weeks  -Follow up with Dr. Malone on July 11th   -Weight bearing status; strict non-weight bearing to right foot    Patient was seen and examined at bedside; discharge planning was discussed with her.  Vital Signs Last 24 Hrs  T(C): 36.7 (28 Jun 2022 12:53), Max: 37.1 (27 Jun 2022 13:55)  T(F): 98.1 (28 Jun 2022 12:53), Max: 98.7 (27 Jun 2022 13:55)  HR: 78 (28 Jun 2022 12:53) (71 - 88)  BP: 100/64 (28 Jun 2022 12:53) (100/64 - 151/70)  BP(mean): --  RR: 16 (28 Jun 2022 12:53) (16 - 20)  SpO2: 98% (28 Jun 2022 06:00) (96% - 100%)  Gen: NAD, comfortable  HEENT: AT, NC, EOMI, MMM  Ext: right LE cast intact  Neuro: AAOX3, no focal deficit  CVS: RRR, no m/r/g, no LE edema  Resp: CTAb/l, no w/r/r  Abd: soft, NT, ND, +BS

## 2022-06-28 NOTE — PATIENT PROFILE ADULT - FALL HARM RISK - HARM RISK INTERVENTIONS

## 2022-06-28 NOTE — DISCHARGE NOTE PROVIDER - NSDCFUADDINST_GEN_ALL_CORE_FT
Things to follow up:  -PCP follow up in 1-2 weeks  -Follow up with Dr. Malone on July 11th   -Weight bearing status; strict non-weight bearing to right foot

## 2022-06-28 NOTE — DISCHARGE NOTE PROVIDER - NSDCMRMEDTOKEN_GEN_ALL_CORE_FT
acetaminophen 325 mg oral tablet: 2 tab(s) orally every 6 hours, As needed, Mild Pain (1 - 3)  hydroCHLOROthiazide 25 mg oral tablet: 1 tab(s) orally once a day  latanoprost 0.005% ophthalmic solution: 1 drop(s) to each affected eye once a day (in the evening)  levothyroxine 75 mcg (0.075 mg) oral tablet: 1 tab(s) orally once a day  metoprolol succinate 25 mg oral capsule, extended release: 1 cap(s) orally once a day  simvastatin 20 mg oral tablet: 1 tab(s) orally once a day (at bedtime)

## 2022-06-29 LAB
HCV AB S/CO SERPL IA: 0.04 COI — SIGNIFICANT CHANGE UP
HCV AB SERPL-IMP: SIGNIFICANT CHANGE UP

## 2022-06-30 LAB — SURGICAL PATHOLOGY STUDY: SIGNIFICANT CHANGE UP

## 2022-07-05 DIAGNOSIS — H40.9 UNSPECIFIED GLAUCOMA: ICD-10-CM

## 2022-07-05 DIAGNOSIS — Z79.890 HORMONE REPLACEMENT THERAPY: ICD-10-CM

## 2022-07-05 DIAGNOSIS — Q66.70 CONGENITAL PES CAVUS, UNSPECIFIED FOOT: ICD-10-CM

## 2022-07-05 DIAGNOSIS — Z88.0 ALLERGY STATUS TO PENICILLIN: ICD-10-CM

## 2022-07-05 DIAGNOSIS — E78.5 HYPERLIPIDEMIA, UNSPECIFIED: ICD-10-CM

## 2022-07-05 DIAGNOSIS — I10 ESSENTIAL (PRIMARY) HYPERTENSION: ICD-10-CM

## 2022-07-05 DIAGNOSIS — Z79.899 OTHER LONG TERM (CURRENT) DRUG THERAPY: ICD-10-CM

## 2022-07-05 DIAGNOSIS — M20.41 OTHER HAMMER TOE(S) (ACQUIRED), RIGHT FOOT: ICD-10-CM

## 2022-07-05 DIAGNOSIS — E03.9 HYPOTHYROIDISM, UNSPECIFIED: ICD-10-CM

## 2022-07-09 ENCOUNTER — EMERGENCY (EMERGENCY)
Facility: HOSPITAL | Age: 62
LOS: 0 days | Discharge: HOME | End: 2022-07-09
Attending: EMERGENCY MEDICINE | Admitting: EMERGENCY MEDICINE

## 2022-07-09 VITALS
RESPIRATION RATE: 20 BRPM | HEIGHT: 67 IN | HEART RATE: 80 BPM | WEIGHT: 205.03 LBS | DIASTOLIC BLOOD PRESSURE: 70 MMHG | OXYGEN SATURATION: 97 % | SYSTOLIC BLOOD PRESSURE: 126 MMHG | TEMPERATURE: 99 F

## 2022-07-09 VITALS
DIASTOLIC BLOOD PRESSURE: 63 MMHG | SYSTOLIC BLOOD PRESSURE: 128 MMHG | HEART RATE: 70 BPM | RESPIRATION RATE: 18 BRPM | TEMPERATURE: 98 F | OXYGEN SATURATION: 100 %

## 2022-07-09 DIAGNOSIS — Z98.890 OTHER SPECIFIED POSTPROCEDURAL STATES: Chronic | ICD-10-CM

## 2022-07-09 DIAGNOSIS — Z00.00 ENCOUNTER FOR GENERAL ADULT MEDICAL EXAMINATION WITHOUT ABNORMAL FINDINGS: ICD-10-CM

## 2022-07-09 DIAGNOSIS — E78.00 PURE HYPERCHOLESTEROLEMIA, UNSPECIFIED: ICD-10-CM

## 2022-07-09 DIAGNOSIS — Z20.822 CONTACT WITH AND (SUSPECTED) EXPOSURE TO COVID-19: ICD-10-CM

## 2022-07-09 DIAGNOSIS — I10 ESSENTIAL (PRIMARY) HYPERTENSION: ICD-10-CM

## 2022-07-09 DIAGNOSIS — M19.90 UNSPECIFIED OSTEOARTHRITIS, UNSPECIFIED SITE: ICD-10-CM

## 2022-07-09 DIAGNOSIS — Z88.0 ALLERGY STATUS TO PENICILLIN: ICD-10-CM

## 2022-07-09 LAB — SARS-COV-2 RNA SPEC QL NAA+PROBE: SIGNIFICANT CHANGE UP

## 2022-07-09 PROCEDURE — 99284 EMERGENCY DEPT VISIT MOD MDM: CPT

## 2022-07-09 NOTE — ED PROVIDER NOTE - CLINICAL SUMMARY MEDICAL DECISION MAKING FREE TEXT BOX
Patient presented originally with her mother, who is being evaluated currently in the emergency department.  Per daughter, mother is patient's primary caregiver and therefore is unable to go home without her.  Denies active complaints at this time.  On arrival patient afebrile, hemodynamically stable.  Ultimately, patient's mother did not require admission and therefore can discharge both of them home.  Patient and mother agreeable with plan. Agrees to return to ED for any new or worsening symptoms. Patient presented originally with her mother, who is being evaluated currently in the emergency department.  Per daughter, mother is patient's primary caregiver and therefore is unable to go home without her.  Denies active complaints at this time.  On arrival patient afebrile, hemodynamically stable.  Ultimately, patient's mother did not require admission and therefore can discharge both of them home.  Patient and mother agreeable with plan. Agrees to return to ED for any new or worsening symptoms..

## 2022-07-09 NOTE — ED PROVIDER NOTE - NS ED ROS FT
Constitutional: no fever, chills, no recent weight loss, change in appetite or malaise  Eyes: no redness/discharge/pain/vision changes  ENT: no rhinorrhea/ear pain/sore throat  Cardiac: No chest pain, SOB or edema.  Respiratory: No cough or respiratory distress  GI: No nausea, vomiting, diarrhea or abdominal pain.  : No dysuria, frequency, urgency or hematuria  MS: See HPI  Neuro: No headache or weakness. No LOC.  Skin: No skin rash.  Endocrine: No history of thyroid disease or diabetes.

## 2022-07-09 NOTE — ED PROVIDER NOTE - PATIENT PORTAL LINK FT
You can access the FollowMyHealth Patient Portal offered by [facility] by registering at the following website: [email or web address]. By joining [facility]’s FollowMyHealth portal, you will also be able to view your health information using other applications (apps) compatible with our system.

## 2022-07-09 NOTE — ED PROVIDER NOTE - PHYSICAL EXAMINATION
CONSTITUTIONAL: Well-appearing; well-nourished; in no apparent distress.   EYES: PERRL; EOM intact.   CARDIOVASCULAR: Normal S1, S2; no murmurs, rubs, or gallops.   RESPIRATORY: Normal chest excursion with respiration; breath sounds clear and equal bilaterally; no wheezes, rhonchi, or rales.  GI/: Normal bowel sounds; non-distended; non-tender; no palpable organomegaly.   MS: Right LE in clean dry dressing and splint.   SKIN: Normal for age and race; warm; dry; good turgor; no apparent lesions or exudate.   NEURO/PSYCH: A & O x 4; grossly unremarkable.

## 2022-07-09 NOTE — ED ADULT NURSE NOTE - NSIMPLEMENTINTERV_GEN_ALL_ED
Implemented All Fall Risk Interventions:  [place] to call system. Call bell, personal items and telephone within reach. Instruct patient to call for assistance. Room bathroom lighting operational. Non-slip footwear when patient is off stretcher. Physically safe environment: no spills, clutter or unnecessary equipment. Stretcher in lowest position, wheels locked, appropriate side rails in place. Provide visual cue, wrist band, yellow gown, etc. Monitor gait and stability. Monitor for mental status changes and reorient to person, place, and time. Review medications for side effects contributing to fall risk. Reinforce activity limits and safety measures with patient and family.

## 2022-07-09 NOTE — ED PROVIDER NOTE - OBJECTIVE STATEMENT
61 years old female history of hypertensions, high cholesterol arthritis present complaint of " I cannot stay at home by myself."  Patient she just had right foot surgery 2 weeks ago and she cannot ambulate with cane secondary to bad left knee.  Patient required her mother,who is in the emergency department now, to care for her so she comes to ED with her.  Patient otherwise denies medical complaints.  Patient does need assistance to help with her ADLs.

## 2022-08-15 PROBLEM — Z00.00 ENCOUNTER FOR PREVENTIVE HEALTH EXAMINATION: Status: ACTIVE | Noted: 2022-08-15

## 2022-09-06 ENCOUNTER — OUTPATIENT (OUTPATIENT)
Dept: OUTPATIENT SERVICES | Facility: HOSPITAL | Age: 62
LOS: 1 days | Discharge: HOME | End: 2022-09-06

## 2022-09-06 DIAGNOSIS — R26.9 UNSPECIFIED ABNORMALITIES OF GAIT AND MOBILITY: ICD-10-CM

## 2022-09-06 DIAGNOSIS — M79.606 PAIN IN LEG, UNSPECIFIED: ICD-10-CM

## 2022-09-06 DIAGNOSIS — Z98.890 OTHER SPECIFIED POSTPROCEDURAL STATES: Chronic | ICD-10-CM

## 2022-11-17 ENCOUNTER — OUTPATIENT (OUTPATIENT)
Dept: OUTPATIENT SERVICES | Facility: HOSPITAL | Age: 62
LOS: 1 days | Discharge: HOME | End: 2022-11-17

## 2022-11-17 VITALS
HEART RATE: 74 BPM | WEIGHT: 201.94 LBS | HEIGHT: 67 IN | DIASTOLIC BLOOD PRESSURE: 73 MMHG | TEMPERATURE: 96 F | OXYGEN SATURATION: 100 % | SYSTOLIC BLOOD PRESSURE: 142 MMHG | RESPIRATION RATE: 18 BRPM

## 2022-11-17 DIAGNOSIS — Z01.818 ENCOUNTER FOR OTHER PREPROCEDURAL EXAMINATION: ICD-10-CM

## 2022-11-17 DIAGNOSIS — Q66.72 CONGENITAL PES CAVUS, LEFT FOOT: ICD-10-CM

## 2022-11-17 DIAGNOSIS — Z98.890 OTHER SPECIFIED POSTPROCEDURAL STATES: Chronic | ICD-10-CM

## 2022-11-17 LAB
ALBUMIN SERPL ELPH-MCNC: 4.7 G/DL — SIGNIFICANT CHANGE UP (ref 3.5–5.2)
ALP SERPL-CCNC: 105 U/L — SIGNIFICANT CHANGE UP (ref 30–115)
ALT FLD-CCNC: 16 U/L — SIGNIFICANT CHANGE UP (ref 0–41)
ANION GAP SERPL CALC-SCNC: 12 MMOL/L — SIGNIFICANT CHANGE UP (ref 7–14)
APTT BLD: 33 SEC — SIGNIFICANT CHANGE UP (ref 27–39.2)
AST SERPL-CCNC: 21 U/L — SIGNIFICANT CHANGE UP (ref 0–41)
BASOPHILS # BLD AUTO: 0.05 K/UL — SIGNIFICANT CHANGE UP (ref 0–0.2)
BASOPHILS NFR BLD AUTO: 0.7 % — SIGNIFICANT CHANGE UP (ref 0–1)
BILIRUB SERPL-MCNC: 0.3 MG/DL — SIGNIFICANT CHANGE UP (ref 0.2–1.2)
BUN SERPL-MCNC: 14 MG/DL — SIGNIFICANT CHANGE UP (ref 10–20)
CALCIUM SERPL-MCNC: 10.1 MG/DL — SIGNIFICANT CHANGE UP (ref 8.4–10.5)
CHLORIDE SERPL-SCNC: 102 MMOL/L — SIGNIFICANT CHANGE UP (ref 98–110)
CO2 SERPL-SCNC: 28 MMOL/L — SIGNIFICANT CHANGE UP (ref 17–32)
CREAT SERPL-MCNC: 0.6 MG/DL — LOW (ref 0.7–1.5)
EGFR: 102 ML/MIN/1.73M2 — SIGNIFICANT CHANGE UP
EOSINOPHIL # BLD AUTO: 0.06 K/UL — SIGNIFICANT CHANGE UP (ref 0–0.7)
EOSINOPHIL NFR BLD AUTO: 0.9 % — SIGNIFICANT CHANGE UP (ref 0–8)
GLUCOSE SERPL-MCNC: 70 MG/DL — SIGNIFICANT CHANGE UP (ref 70–99)
HCT VFR BLD CALC: 41.9 % — SIGNIFICANT CHANGE UP (ref 37–47)
HGB BLD-MCNC: 13.6 G/DL — SIGNIFICANT CHANGE UP (ref 12–16)
IMM GRANULOCYTES NFR BLD AUTO: 0.3 % — SIGNIFICANT CHANGE UP (ref 0.1–0.3)
INR BLD: 1.05 RATIO — SIGNIFICANT CHANGE UP (ref 0.65–1.3)
LYMPHOCYTES # BLD AUTO: 2.01 K/UL — SIGNIFICANT CHANGE UP (ref 1.2–3.4)
LYMPHOCYTES # BLD AUTO: 30 % — SIGNIFICANT CHANGE UP (ref 20.5–51.1)
MCHC RBC-ENTMCNC: 26.7 PG — LOW (ref 27–31)
MCHC RBC-ENTMCNC: 32.5 G/DL — SIGNIFICANT CHANGE UP (ref 32–37)
MCV RBC AUTO: 82.2 FL — SIGNIFICANT CHANGE UP (ref 81–99)
MONOCYTES # BLD AUTO: 0.77 K/UL — HIGH (ref 0.1–0.6)
MONOCYTES NFR BLD AUTO: 11.5 % — HIGH (ref 1.7–9.3)
NEUTROPHILS # BLD AUTO: 3.79 K/UL — SIGNIFICANT CHANGE UP (ref 1.4–6.5)
NEUTROPHILS NFR BLD AUTO: 56.6 % — SIGNIFICANT CHANGE UP (ref 42.2–75.2)
NRBC # BLD: 0 /100 WBCS — SIGNIFICANT CHANGE UP (ref 0–0)
PLATELET # BLD AUTO: 250 K/UL — SIGNIFICANT CHANGE UP (ref 130–400)
POTASSIUM SERPL-MCNC: 4.2 MMOL/L — SIGNIFICANT CHANGE UP (ref 3.5–5)
POTASSIUM SERPL-SCNC: 4.2 MMOL/L — SIGNIFICANT CHANGE UP (ref 3.5–5)
PROT SERPL-MCNC: 7.3 G/DL — SIGNIFICANT CHANGE UP (ref 6–8)
PROTHROM AB SERPL-ACNC: 12 SEC — SIGNIFICANT CHANGE UP (ref 9.95–12.87)
RBC # BLD: 5.1 M/UL — SIGNIFICANT CHANGE UP (ref 4.2–5.4)
RBC # FLD: 14.4 % — SIGNIFICANT CHANGE UP (ref 11.5–14.5)
SODIUM SERPL-SCNC: 142 MMOL/L — SIGNIFICANT CHANGE UP (ref 135–146)
WBC # BLD: 6.7 K/UL — SIGNIFICANT CHANGE UP (ref 4.8–10.8)
WBC # FLD AUTO: 6.7 K/UL — SIGNIFICANT CHANGE UP (ref 4.8–10.8)

## 2022-11-17 PROCEDURE — 93010 ELECTROCARDIOGRAM REPORT: CPT

## 2022-11-17 NOTE — H&P PST ADULT - REASON FOR ADMISSION
62 Y/O F WITH PMHX OF HYPOTHYROIDISM AND HTN, SCHEDULED FOR PAST FOR  left cavus foot reconstruction calcaneal osteotomy, cuboid osteotomy, first metatarsal osteotomy, peroneal tendon transfer with multiple hammer toe correction hardware fixation and popliteal block ON 12/13/22 WITH DR OCASIO UNDER [facility].  PT REPORTS CONGENTIAL FOOT DEFORMITIES. SHE IS S/P THE ABOVE PROCEDURE ON RIGHT FOOT IN JUNE 2022. PT REPORTS NO CHANGES TO MEDICAL HISTORY SINCE LAST VISIT TO PAST AND SHE TOLERATED PROCEDURE WELL.

## 2022-11-17 NOTE — H&P PST ADULT - HISTORY OF PRESENT ILLNESS
CURRENTLY  DENIES ANY CP, SOB, PALPITATIONS, COUGH OR DYSURIA  EXERCISE TOLERANCE 2 FOS WITHOUT SOB      AS PER PATIENT  this is his/her complete medical history including medications - PRESCRIPTIONS  OVER THE COUNTER MEDS    pt denies any covid s/s, or tested positive in the past.  Received covid vaccine  pt advised self quarantine till day of procedure    Anesthesia Alert  NO--Difficult Airway  NO--History of neck surgery or radiation  NO--Limited ROM of neck  NO--History of Malignant hyperthermia  NO--No personal or family history of Pseudocholinesterase deficiency.  NO--Prior Anesthesia Complication  NO--Latex Allergy  NO--Loose teeth  NO--History of Rheumatoid Arthritis  NO--SLICK  NO--Bleeding risk  NO--Other_____    Patient verbalized understanding of instructions and was given the opportunity to ask questions and have them answered.

## 2022-11-17 NOTE — H&P PST ADULT - NSANTHOSAYNRD_GEN_A_CORE
No. SLICK screening performed.  STOP BANG Legend: 0-2 = LOW Risk; 3-4 = INTERMEDIATE Risk; 5-8 = HIGH Risk

## 2022-12-13 ENCOUNTER — RESULT REVIEW (OUTPATIENT)
Age: 62
End: 2022-12-13

## 2022-12-13 ENCOUNTER — TRANSCRIPTION ENCOUNTER (OUTPATIENT)
Age: 62
End: 2022-12-13

## 2022-12-13 ENCOUNTER — OUTPATIENT (OUTPATIENT)
Dept: OUTPATIENT SERVICES | Facility: HOSPITAL | Age: 62
LOS: 1 days | Discharge: HOME | End: 2022-12-13

## 2022-12-13 VITALS
WEIGHT: 201.94 LBS | OXYGEN SATURATION: 98 % | SYSTOLIC BLOOD PRESSURE: 135 MMHG | DIASTOLIC BLOOD PRESSURE: 81 MMHG | HEART RATE: 74 BPM | TEMPERATURE: 97 F | HEIGHT: 67 IN | RESPIRATION RATE: 18 BRPM

## 2022-12-13 VITALS
RESPIRATION RATE: 16 BRPM | OXYGEN SATURATION: 97 % | HEART RATE: 80 BPM | DIASTOLIC BLOOD PRESSURE: 71 MMHG | SYSTOLIC BLOOD PRESSURE: 113 MMHG

## 2022-12-13 DIAGNOSIS — Z98.890 OTHER SPECIFIED POSTPROCEDURAL STATES: Chronic | ICD-10-CM

## 2022-12-13 PROCEDURE — 73630 X-RAY EXAM OF FOOT: CPT | Mod: 26,LT

## 2022-12-13 PROCEDURE — 88311 DECALCIFY TISSUE: CPT | Mod: 26

## 2022-12-13 PROCEDURE — 88304 TISSUE EXAM BY PATHOLOGIST: CPT | Mod: 26

## 2022-12-13 RX ORDER — SODIUM CHLORIDE 9 MG/ML
1000 INJECTION, SOLUTION INTRAVENOUS
Refills: 0 | Status: DISCONTINUED | OUTPATIENT
Start: 2022-12-13 | End: 2022-12-14

## 2022-12-13 RX ORDER — MEPERIDINE HYDROCHLORIDE 50 MG/ML
12.5 INJECTION INTRAMUSCULAR; INTRAVENOUS; SUBCUTANEOUS
Refills: 0 | Status: DISCONTINUED | OUTPATIENT
Start: 2022-12-13 | End: 2022-12-14

## 2022-12-13 RX ORDER — METOCLOPRAMIDE HCL 10 MG
10 TABLET ORAL ONCE
Refills: 0 | Status: DISCONTINUED | OUTPATIENT
Start: 2022-12-13 | End: 2022-12-14

## 2022-12-13 RX ORDER — ONDANSETRON 8 MG/1
4 TABLET, FILM COATED ORAL ONCE
Refills: 0 | Status: DISCONTINUED | OUTPATIENT
Start: 2022-12-13 | End: 2022-12-14

## 2022-12-13 RX ORDER — HYDROMORPHONE HYDROCHLORIDE 2 MG/ML
1 INJECTION INTRAMUSCULAR; INTRAVENOUS; SUBCUTANEOUS
Refills: 0 | Status: DISCONTINUED | OUTPATIENT
Start: 2022-12-13 | End: 2022-12-14

## 2022-12-13 RX ORDER — HYDROMORPHONE HYDROCHLORIDE 2 MG/ML
0.5 INJECTION INTRAMUSCULAR; INTRAVENOUS; SUBCUTANEOUS
Refills: 0 | Status: DISCONTINUED | OUTPATIENT
Start: 2022-12-13 | End: 2022-12-14

## 2022-12-13 NOTE — ASU DISCHARGE PLAN (ADULT/PEDIATRIC) - NS MD DC FALL RISK RISK
For information on Fall & Injury Prevention, visit: [email or web address] OR  [email or web address] OR  https://www.cdc.gov/steadi/patient.html

## 2022-12-13 NOTE — CHART NOTE - NSCHARTNOTEFT_GEN_A_CORE
PACU ANESTHESIA ADMISSION NOTE      Procedure: Reconstruction, cavus foot      Post op diagnosis:  Cavus foot, acquired        ____  Intubated  TV:______       Rate: ______      FiO2: ______    _x___  Patent Airway    __x__  Full return of protective reflexes    _x___  Full recovery from anesthesia / back to baseline     Vitals:   T:  98         R:  18                BP:  145/86                Sat:98%                   P: 85      Mental Status:  __x__ Awake   _____ Alert   _____ Drowsy   _____ Sedated    Nausea/Vomiting:  __x__ NO  ______Yes,   See Post - Op Orders          Pain Scale (0-10):  _____    Treatment: ____ None    x___ See Post - Op/PCA Orders    Post - Operative Fluids:   ____ Oral   ____x See Post - Op Orders    Plan: Discharge:   __x__Home       _____Floor     _____Critical Care    _____  Other:_________________    Comments:

## 2022-12-13 NOTE — ASU DISCHARGE PLAN (ADULT/PEDIATRIC) - CALL YOUR DOCTOR IF YOU HAVE ANY OF THE FOLLOWING:
Swelling that gets worse/Pain not relieved by Medications/Wound/Surgical Site with redness, or foul smelling discharge or pus/Numbness, tingling, color or temperature change to extremity/Excessive diarrhea/Inability to tolerate liquids or foods

## 2022-12-13 NOTE — PRE-ANESTHESIA EVALUATION ADULT - WEIGHT IN KG
DEXAMETHASONE 10 MG GIVEN INTRAMUSCULARLY IN RIGHT GLUTEAL. PT TOLERATED WELL. MG/24HR Place 1 patch onto the skin every 24 hours Start this after completing the 21 mg patches 30 patch 0    nicotine (NICODERM CQ) 7 MG/24HR Place 1 patch onto the skin every 24 hours Start this after completing the 14 mg patches 30 patch 1    Docusate Calcium (STOOL SOFTENER PO) Take by mouth daily      carvedilol (COREG) 12.5 MG tablet Take 1 tablet by mouth 2 times daily (Patient taking differently: Take 6.25 mg by mouth 2 times daily ) 60 tablet 3    medroxyPROGESTERone (DEPO-PROVERA) 150 MG/ML injection Inject 1 mL into the muscle once for 1 dose 1 mL 3    Prenatal Vit-Fe Fumarate-FA (PRENATAL VITAMIN PO) Take by mouth       Current Facility-Administered Medications   Medication Dose Route Frequency Provider Last Rate Last Dose    dexamethasone (DECADRON) injection 10 mg  10 mg Intramuscular Once SAMUEL Luu         No Known Allergies    Health Maintenance   Topic Date Due    DTaP/Tdap/Td vaccine (1 - Tdap) 11/26/2010    Pneumococcal med risk (1 of 1 - PPSV23) 11/26/2010    Flu vaccine (1) 09/01/2018    Cervical cancer screen  08/09/2020    HIV screen  Completed       Subjective:      Review of Systems   Constitutional: Positive for fatigue. Negative for chills and fever. HENT: Positive for congestion. Negative for ear pain, rhinorrhea and sore throat. Respiratory: Positive for cough, shortness of breath and wheezing. Gastrointestinal: Negative for abdominal pain, diarrhea, nausea and vomiting. Skin: Negative for rash. All other systems reviewed and are negative. Objective:     Physical Exam   Constitutional: She is oriented to person, place, and time. She appears well-developed and well-nourished. No distress. HENT:   Head: Normocephalic and atraumatic.    Right Ear: Hearing, tympanic membrane, external ear and ear canal normal.   Left Ear: Tympanic membrane, external ear and ear canal normal.   Nose: Nose normal.   Mouth/Throat: Uvula is midline, oropharynx is clear 91.6

## 2022-12-15 LAB — SURGICAL PATHOLOGY STUDY: SIGNIFICANT CHANGE UP

## 2022-12-22 DIAGNOSIS — Z88.0 ALLERGY STATUS TO PENICILLIN: ICD-10-CM

## 2022-12-22 DIAGNOSIS — Q66.72 CONGENITAL PES CAVUS, LEFT FOOT: ICD-10-CM

## 2022-12-22 DIAGNOSIS — E78.00 PURE HYPERCHOLESTEROLEMIA, UNSPECIFIED: ICD-10-CM

## 2022-12-22 DIAGNOSIS — M20.42 OTHER HAMMER TOE(S) (ACQUIRED), LEFT FOOT: ICD-10-CM

## 2022-12-22 DIAGNOSIS — I10 ESSENTIAL (PRIMARY) HYPERTENSION: ICD-10-CM

## 2023-01-27 ENCOUNTER — INPATIENT (INPATIENT)
Facility: HOSPITAL | Age: 63
LOS: 6 days | Discharge: NURSING, REHAB OR LONG-TERM CARE | End: 2023-02-03
Attending: INTERNAL MEDICINE | Admitting: INTERNAL MEDICINE
Payer: MEDICAID

## 2023-01-27 VITALS
WEIGHT: 220.02 LBS | DIASTOLIC BLOOD PRESSURE: 88 MMHG | RESPIRATION RATE: 17 BRPM | OXYGEN SATURATION: 100 % | HEIGHT: 67 IN | SYSTOLIC BLOOD PRESSURE: 161 MMHG | HEART RATE: 90 BPM

## 2023-01-27 DIAGNOSIS — Z98.890 OTHER SPECIFIED POSTPROCEDURAL STATES: Chronic | ICD-10-CM

## 2023-01-27 LAB
ALBUMIN SERPL ELPH-MCNC: 4.1 G/DL — SIGNIFICANT CHANGE UP (ref 3.5–5.2)
ALP SERPL-CCNC: 101 U/L — SIGNIFICANT CHANGE UP (ref 30–115)
ALT FLD-CCNC: 16 U/L — SIGNIFICANT CHANGE UP (ref 0–41)
ANION GAP SERPL CALC-SCNC: 11 MMOL/L — SIGNIFICANT CHANGE UP (ref 7–14)
AST SERPL-CCNC: 17 U/L — SIGNIFICANT CHANGE UP (ref 0–41)
BASOPHILS # BLD AUTO: 0.06 K/UL — SIGNIFICANT CHANGE UP (ref 0–0.2)
BASOPHILS NFR BLD AUTO: 0.7 % — SIGNIFICANT CHANGE UP (ref 0–1)
BILIRUB SERPL-MCNC: <0.2 MG/DL — SIGNIFICANT CHANGE UP (ref 0.2–1.2)
BUN SERPL-MCNC: 15 MG/DL — SIGNIFICANT CHANGE UP (ref 10–20)
CALCIUM SERPL-MCNC: 9.3 MG/DL — SIGNIFICANT CHANGE UP (ref 8.4–10.5)
CHLORIDE SERPL-SCNC: 105 MMOL/L — SIGNIFICANT CHANGE UP (ref 98–110)
CO2 SERPL-SCNC: 24 MMOL/L — SIGNIFICANT CHANGE UP (ref 17–32)
CREAT SERPL-MCNC: 0.5 MG/DL — LOW (ref 0.7–1.5)
EGFR: 107 ML/MIN/1.73M2 — SIGNIFICANT CHANGE UP
EOSINOPHIL # BLD AUTO: 0.21 K/UL — SIGNIFICANT CHANGE UP (ref 0–0.7)
EOSINOPHIL NFR BLD AUTO: 2.3 % — SIGNIFICANT CHANGE UP (ref 0–8)
GLUCOSE SERPL-MCNC: 152 MG/DL — HIGH (ref 70–99)
HCT VFR BLD CALC: 41.7 % — SIGNIFICANT CHANGE UP (ref 37–47)
HGB BLD-MCNC: 13.2 G/DL — SIGNIFICANT CHANGE UP (ref 12–16)
IMM GRANULOCYTES NFR BLD AUTO: 0.2 % — SIGNIFICANT CHANGE UP (ref 0.1–0.3)
LYMPHOCYTES # BLD AUTO: 1.93 K/UL — SIGNIFICANT CHANGE UP (ref 1.2–3.4)
LYMPHOCYTES # BLD AUTO: 21.1 % — SIGNIFICANT CHANGE UP (ref 20.5–51.1)
MCHC RBC-ENTMCNC: 26.1 PG — LOW (ref 27–31)
MCHC RBC-ENTMCNC: 31.7 G/DL — LOW (ref 32–37)
MCV RBC AUTO: 82.4 FL — SIGNIFICANT CHANGE UP (ref 81–99)
MONOCYTES # BLD AUTO: 0.89 K/UL — HIGH (ref 0.1–0.6)
MONOCYTES NFR BLD AUTO: 9.7 % — HIGH (ref 1.7–9.3)
NEUTROPHILS # BLD AUTO: 6.03 K/UL — SIGNIFICANT CHANGE UP (ref 1.4–6.5)
NEUTROPHILS NFR BLD AUTO: 66 % — SIGNIFICANT CHANGE UP (ref 42.2–75.2)
NRBC # BLD: 0 /100 WBCS — SIGNIFICANT CHANGE UP (ref 0–0)
PLATELET # BLD AUTO: 301 K/UL — SIGNIFICANT CHANGE UP (ref 130–400)
POTASSIUM SERPL-MCNC: 3.8 MMOL/L — SIGNIFICANT CHANGE UP (ref 3.5–5)
POTASSIUM SERPL-SCNC: 3.8 MMOL/L — SIGNIFICANT CHANGE UP (ref 3.5–5)
PROT SERPL-MCNC: 6.6 G/DL — SIGNIFICANT CHANGE UP (ref 6–8)
RBC # BLD: 5.06 M/UL — SIGNIFICANT CHANGE UP (ref 4.2–5.4)
RBC # FLD: 15.9 % — HIGH (ref 11.5–14.5)
SARS-COV-2 RNA SPEC QL NAA+PROBE: SIGNIFICANT CHANGE UP
SODIUM SERPL-SCNC: 140 MMOL/L — SIGNIFICANT CHANGE UP (ref 135–146)
WBC # BLD: 9.14 K/UL — SIGNIFICANT CHANGE UP (ref 4.8–10.8)
WBC # FLD AUTO: 9.14 K/UL — SIGNIFICANT CHANGE UP (ref 4.8–10.8)

## 2023-01-27 PROCEDURE — 99222 1ST HOSP IP/OBS MODERATE 55: CPT

## 2023-01-27 PROCEDURE — 99285 EMERGENCY DEPT VISIT HI MDM: CPT

## 2023-01-27 PROCEDURE — 73630 X-RAY EXAM OF FOOT: CPT | Mod: 26,LT

## 2023-01-27 RX ORDER — PANTOPRAZOLE SODIUM 20 MG/1
40 TABLET, DELAYED RELEASE ORAL
Refills: 0 | Status: DISCONTINUED | OUTPATIENT
Start: 2023-01-27 | End: 2023-02-03

## 2023-01-27 RX ORDER — LANOLIN ALCOHOL/MO/W.PET/CERES
3 CREAM (GRAM) TOPICAL AT BEDTIME
Refills: 0 | Status: DISCONTINUED | OUTPATIENT
Start: 2023-01-27 | End: 2023-02-03

## 2023-01-27 RX ORDER — OXYCODONE AND ACETAMINOPHEN 5; 325 MG/1; MG/1
2 TABLET ORAL EVERY 8 HOURS
Refills: 0 | Status: DISCONTINUED | OUTPATIENT
Start: 2023-01-27 | End: 2023-01-27

## 2023-01-27 RX ORDER — METOPROLOL TARTRATE 50 MG
25 TABLET ORAL DAILY
Refills: 0 | Status: DISCONTINUED | OUTPATIENT
Start: 2023-01-27 | End: 2023-02-03

## 2023-01-27 RX ORDER — ENOXAPARIN SODIUM 100 MG/ML
40 INJECTION SUBCUTANEOUS EVERY 24 HOURS
Refills: 0 | Status: DISCONTINUED | OUTPATIENT
Start: 2023-01-27 | End: 2023-02-03

## 2023-01-27 RX ORDER — SIMVASTATIN 20 MG/1
20 TABLET, FILM COATED ORAL AT BEDTIME
Refills: 0 | Status: DISCONTINUED | OUTPATIENT
Start: 2023-01-27 | End: 2023-02-03

## 2023-01-27 RX ORDER — LEVOTHYROXINE SODIUM 125 MCG
75 TABLET ORAL DAILY
Refills: 0 | Status: DISCONTINUED | OUTPATIENT
Start: 2023-01-27 | End: 2023-02-03

## 2023-01-27 RX ORDER — ACETAMINOPHEN 500 MG
650 TABLET ORAL EVERY 6 HOURS
Refills: 0 | Status: DISCONTINUED | OUTPATIENT
Start: 2023-01-27 | End: 2023-02-03

## 2023-01-27 RX ORDER — LATANOPROST 0.05 MG/ML
1 SOLUTION/ DROPS OPHTHALMIC; TOPICAL AT BEDTIME
Refills: 0 | Status: DISCONTINUED | OUTPATIENT
Start: 2023-01-27 | End: 2023-02-03

## 2023-01-27 RX ORDER — POLYETHYLENE GLYCOL 3350 17 G/17G
17 POWDER, FOR SOLUTION ORAL DAILY
Refills: 0 | Status: DISCONTINUED | OUTPATIENT
Start: 2023-01-27 | End: 2023-02-03

## 2023-01-27 RX ORDER — CHLORHEXIDINE GLUCONATE 213 G/1000ML
1 SOLUTION TOPICAL
Refills: 0 | Status: DISCONTINUED | OUTPATIENT
Start: 2023-01-27 | End: 2023-02-03

## 2023-01-27 RX ORDER — ONDANSETRON 8 MG/1
4 TABLET, FILM COATED ORAL EVERY 8 HOURS
Refills: 0 | Status: DISCONTINUED | OUTPATIENT
Start: 2023-01-27 | End: 2023-02-03

## 2023-01-27 NOTE — H&P ADULT - NSHPLABSRESULTS_GEN_ALL_CORE
01-27    140  |  105  |  15  ----------------------------<  152<H>  3.8   |  24  |  0.5<L>    Ca    9.3      27 Jan 2023 21:19    TPro  6.6  /  Alb  4.1  /  TBili  <0.2  /  DBili  x   /  AST  17  /  ALT  16  /  AlkPhos  101  01-27                            13.2   9.14  )-----------( 301      ( 27 Jan 2023 21:19 )             41.7    (1) Order Name: Xray Foot AP + Lateral + Oblique, Left Order ID: [id number] Order Date/Time: 27-Jan-2023 20:55 Order Status: Performed.     Interpretation Date/Time: 27-Jan-2023 22:30 Interpreted By: Dima Robin Jr     Interpretation: No obvious fracture, has chronic deformity to fifth digit at the PIP, hardware in place no obvious dislocation.

## 2023-01-27 NOTE — H&P ADULT - NSHPPHYSICALEXAM_GEN_ALL_CORE
GENERAL: Well-nourished, Well-developed. NAD.  HEAD: No visible or palpable bumps or hematomas. No ecchymosis behind ears B/L.  Eyes: EOMI. No asymmetry. No nystagmus. No conjunctival injection. Non-icteric sclera.  ENMT: MMM.  Neck: Supple. FROM  CVS: Normal S1,S2. No murmurs appreciated on auscultation   RESP: No use of accessory muscles. Chest rise symmetrical with good expansion. Lungs clear to auscultation B/L. No wheezing, rales, or rhonchi auscultated.  MSK: (+)L foot no swelling or deformity, FROM L foot and ankle  Skin: (+)healed incision sites, no discharge/warmth/erythema  EXT: Radial and pedal pulses present B/L. No calf tenderness or swelling B/L. No palpable cords. No pedal edema B/L.  Neuro: NVI

## 2023-01-27 NOTE — ED PROVIDER NOTE - WR INTERPRETATION 1
No obvious fracture, has chronic deformity to fifth digit at the PIP, hardware in place no obvious dislocation

## 2023-01-27 NOTE — ED ADULT NURSE NOTE - NSIMPLEMENTINTERV_GEN_ALL_ED
Implemented All Universal Safety Interventions:  [place] to call system. Call bell, personal items and telephone within reach. Instruct patient to call for assistance. Room bathroom lighting operational. Non-slip footwear when patient is off stretcher. Physically safe environment: no spills, clutter or unnecessary equipment. Stretcher in lowest position, wheels locked, appropriate side rails in place.

## 2023-01-27 NOTE — ED PROVIDER NOTE - CLINICAL SUMMARY MEDICAL DECISION MAKING FREE TEXT BOX
62-year-old female with a past medical history of recent left foot surgery with podiatry in December states that she came in because she is unable to get out of her hospital bed at home and has been having to move around with a wheelchair for the past 6 weeks.  Has been urinating in a bedpan.  States that her 80-year-old mother is unable to take care of her and she is unable to take care of herself at home.  Patient states that she "needs physical therapy.  "No recent injuries or swelling.  No fever or chills.  No redness.  On exam nontoxic, vital signs stable, left foot with no obvious swelling or deformity, full range of motion at all joints, incisions appear clean and dry and no signs of infection.  Neurovascular intact.  X-ray as above.  Labs unremarkable.  Patient with limited mobility at home and needs physical therapy evaluation.  Also does not feel she can care for self at home so will need to be admitted for social reasons and possible placement.  Podiatry made aware and they will see as inpatient

## 2023-01-27 NOTE — ED PROVIDER NOTE - PHYSICAL EXAMINATION
GENERAL: Well-nourished, Well-developed. NAD.  HEAD: No visible or palpable bumps or hematomas. No ecchymosis behind ears B/L.  Eyes: PERRLA, EOMI. No asymmetry. No nystagmus. No conjunctival injection. Non-icteric sclera.  ENMT: MMM.  Neck: Supple. FROM  CVS: Normal S1,S2. No murmurs appreciated on auscultation   RESP: No use of accessory muscles. Chest rise symmetrical with good expansion. Lungs clear to auscultation B/L. No wheezing, rales, or rhonchi auscultated.  MSK: (+)L foot no swelling or deformity, FROM L foot and ankle  Skin: (+)healed incision sites, no discharge/warmth/erythema  EXT: Radial and pedal pulses present B/L. No calf tenderness or swelling B/L. No palpable cords. No pedal edema B/L.  Neuro: NVI

## 2023-01-27 NOTE — ED ADULT NURSE NOTE - OBJECTIVE STATEMENT
Pt states has not ambulated since left foot surgery on 12/13/22. Pt denies any pain, states saw PMD on 1/25/23 had xrays which showed fx is healing well.

## 2023-01-27 NOTE — ED PROVIDER NOTE - OBJECTIVE STATEMENT
62-year-old female no past medical history presenting to the ED reporting she has a left foot procedure done by Dr. Malone on December 13, 2022 reporting since that time she has been unable to care for herself at home.  Patient lives with her mother who is in her 80s, reports that while at home she is having difficulty ambulating and is using a bedpan, her mother cannot take care of her and patient states she cannot care for herself.  Patient has seen her podiatrist today as follow-up, reports that her foot is healing appropriately, patient however reporting that she cannot go home safely and is requesting admission for rehabilitation.  Denies fever, chills, numbness/tingling, weakness, rash, warmth, discharge, chest pain, sob

## 2023-01-27 NOTE — H&P ADULT - ASSESSMENT
62-year-old female with past medical history of HTN/ HLD presenting to the ED reporting she has a left foot procedure done by Dr. Malone on December 13, 2022 reporting since that time she has been unable to care for herself at home.     # Unable to ambulate   # left foot pain h/o surgery 12/2022  # failure to thrive   - pt consult   - podiatry consult   - pain meds prn   - social service   - dvt/ ppi ppx   - LWC for now until eval by podiatry     # HTN  - low sodium diet  - monitor vitals   - c/w home meds    # HLD  - c/w statin   - low fat diet     # Hypothyroidism   - c/w synthroid   - tsh level in am     d/w Dr. Chung  62-year-old female with past medical history of HTN/ HLD presenting to the ED reporting she has a left foot procedure done by Dr. Malone on December 13, 2022 reporting since that time she has been unable to care for herself at home.     # Unable to ambulate   # left foot pain h/o surgery 12/2022  # failure to thrive   - pt consult   - podiatry consult   - pain meds prn   - social service   - dvt/ ppi ppx   - LWC for now until eval by podiatry   -  consult    #skin tear over coccyx present on admission   wound care consult    # HTN  - low sodium diet  - monitor vitals   - c/w home meds    # HLD  - c/w statin   - low fat diet     # Hypothyroidism   - c/w synthroid   - tsh level in am     d/w Dr. Chung  62-year-old female with past medical history of HTN/ HLD presenting to the ED reporting she has a left foot procedure done by Dr. Malone on December 13, 2022 reporting since that time she has been unable to care for herself at home.     # Unable to ambulate due to left foot pain h/o surgery 12/13/2022  # failure to thrive   - pt consult   - podiatry consult   - pain meds prn   - social service   - dvt/ ppi ppx   - LWC for now until eval by podiatry   -  consult    #skin tear over coccyx present on admission   wound care consult    # HTN  - low sodium diet  - monitor vitals   - c/w home meds    # HLD  - c/w statin   - low fat diet     # Hypothyroidism   - c/w synthroid   - tsh level in am     d/w Dr. Chung

## 2023-01-27 NOTE — H&P ADULT - HISTORY OF PRESENT ILLNESS
62-year-old female with past medical history of HTN/ HLD / hypothy. presenting to the ED reporting she has a left foot procedure done by Dr. Malone on December 13, 2022 reporting since that time she has been unable to care for herself at home.  Patient lives with her mother who is in her 80s, reports that while at home she is having difficulty ambulating and is using a bedpan, her mother cannot take care of her and patient states she cannot care for herself.  Patient has seen her podiatrist today as follow-up, reports that her foot is healing appropriately, patient however reporting that she cannot go home safely and is requesting admission for rehabilitation.      Denies fever, chills, numbness/tingling, weakness, rash, warmth, discharge, chest pain, sob

## 2023-01-28 LAB
ALBUMIN SERPL ELPH-MCNC: 4.4 G/DL — SIGNIFICANT CHANGE UP (ref 3.5–5.2)
ALP SERPL-CCNC: 111 U/L — SIGNIFICANT CHANGE UP (ref 30–115)
ALT FLD-CCNC: 15 U/L — SIGNIFICANT CHANGE UP (ref 0–41)
ANION GAP SERPL CALC-SCNC: 11 MMOL/L — SIGNIFICANT CHANGE UP (ref 7–14)
AST SERPL-CCNC: 19 U/L — SIGNIFICANT CHANGE UP (ref 0–41)
BILIRUB SERPL-MCNC: 0.5 MG/DL — SIGNIFICANT CHANGE UP (ref 0.2–1.2)
BUN SERPL-MCNC: 11 MG/DL — SIGNIFICANT CHANGE UP (ref 10–20)
CALCIUM SERPL-MCNC: 9.7 MG/DL — SIGNIFICANT CHANGE UP (ref 8.4–10.5)
CHLORIDE SERPL-SCNC: 105 MMOL/L — SIGNIFICANT CHANGE UP (ref 98–110)
CO2 SERPL-SCNC: 29 MMOL/L — SIGNIFICANT CHANGE UP (ref 17–32)
CREAT SERPL-MCNC: 0.5 MG/DL — LOW (ref 0.7–1.5)
EGFR: 107 ML/MIN/1.73M2 — SIGNIFICANT CHANGE UP
GLUCOSE SERPL-MCNC: 76 MG/DL — SIGNIFICANT CHANGE UP (ref 70–99)
HCT VFR BLD CALC: 42.4 % — SIGNIFICANT CHANGE UP (ref 37–47)
HGB BLD-MCNC: 13.7 G/DL — SIGNIFICANT CHANGE UP (ref 12–16)
MCHC RBC-ENTMCNC: 26.6 PG — LOW (ref 27–31)
MCHC RBC-ENTMCNC: 32.3 G/DL — SIGNIFICANT CHANGE UP (ref 32–37)
MCV RBC AUTO: 82.3 FL — SIGNIFICANT CHANGE UP (ref 81–99)
NRBC # BLD: 0 /100 WBCS — SIGNIFICANT CHANGE UP (ref 0–0)
PLATELET # BLD AUTO: 310 K/UL — SIGNIFICANT CHANGE UP (ref 130–400)
POTASSIUM SERPL-MCNC: 4.3 MMOL/L — SIGNIFICANT CHANGE UP (ref 3.5–5)
POTASSIUM SERPL-SCNC: 4.3 MMOL/L — SIGNIFICANT CHANGE UP (ref 3.5–5)
PROT SERPL-MCNC: 6.8 G/DL — SIGNIFICANT CHANGE UP (ref 6–8)
RBC # BLD: 5.15 M/UL — SIGNIFICANT CHANGE UP (ref 4.2–5.4)
RBC # FLD: 15.9 % — HIGH (ref 11.5–14.5)
SODIUM SERPL-SCNC: 145 MMOL/L — SIGNIFICANT CHANGE UP (ref 135–146)
TSH SERPL-MCNC: 4.21 UIU/ML — HIGH (ref 0.27–4.2)
WBC # BLD: 8.25 K/UL — SIGNIFICANT CHANGE UP (ref 4.8–10.8)
WBC # FLD AUTO: 8.25 K/UL — SIGNIFICANT CHANGE UP (ref 4.8–10.8)

## 2023-01-28 PROCEDURE — 99231 SBSQ HOSP IP/OBS SF/LOW 25: CPT

## 2023-01-28 RX ADMIN — ENOXAPARIN SODIUM 40 MILLIGRAM(S): 100 INJECTION SUBCUTANEOUS at 05:03

## 2023-01-28 RX ADMIN — SIMVASTATIN 20 MILLIGRAM(S): 20 TABLET, FILM COATED ORAL at 21:19

## 2023-01-28 RX ADMIN — Medication 650 MILLIGRAM(S): at 05:02

## 2023-01-28 RX ADMIN — Medication 650 MILLIGRAM(S): at 12:32

## 2023-01-28 RX ADMIN — Medication 75 MICROGRAM(S): at 05:03

## 2023-01-28 RX ADMIN — CHLORHEXIDINE GLUCONATE 1 APPLICATION(S): 213 SOLUTION TOPICAL at 05:05

## 2023-01-28 RX ADMIN — LATANOPROST 1 DROP(S): 0.05 SOLUTION/ DROPS OPHTHALMIC; TOPICAL at 21:27

## 2023-01-28 RX ADMIN — Medication 650 MILLIGRAM(S): at 11:28

## 2023-01-28 RX ADMIN — Medication 25 MILLIGRAM(S): at 05:03

## 2023-01-28 RX ADMIN — PANTOPRAZOLE SODIUM 40 MILLIGRAM(S): 20 TABLET, DELAYED RELEASE ORAL at 05:06

## 2023-01-28 NOTE — PHYSICAL THERAPY INITIAL EVALUATION ADULT - PERTINENT HX OF CURRENT PROBLEM, REHAB EVAL
L foot s/p reconstruction. Inability to ambulate.     62-year-old female with past medical history of HTN/ HLD / hypothy. presenting to the ED reporting she has a left foot procedure done by Dr. Malone on December 13, 2022 reporting since that time she has been unable to care for herself at home.  Patient lives with her mother who is in her 80s, reports that while at home she is having difficulty ambulating and is using a bedpan, her mother cannot take care of her and patient states she cannot care for herself.  Patient has seen her podiatrist today as follow-up, reports that her foot is healing appropriately, patient however reporting that she cannot go home safely and is requesting admission for rehabilitation.

## 2023-01-28 NOTE — DIETITIAN INITIAL EVALUATION ADULT - OTHER INFO
pt is 62 year old female with hx of HTN, HLD presents with inability to ambulate or care for self since L foot procedure performed in December, seeking rehab placement.

## 2023-01-28 NOTE — DIETITIAN INITIAL EVALUATION ADULT - ADD RECOMMEND
Rd to monitor tolerance to po diet, labs/meds, NFPF , wound care rec's and f/u as needed within 5-7 days  moderate risk

## 2023-01-28 NOTE — PATIENT PROFILE ADULT - FALL HARM RISK - RISK INTERVENTIONS

## 2023-01-28 NOTE — PHYSICAL THERAPY INITIAL EVALUATION ADULT - ADDITIONAL COMMENTS
Patient lives with 87-year old mother in private residence with 6 entry steps (2 no HR, 4 with B HR). Patient previously independent in all functional mobility without AD prior to surgery. Patient has RW & hospital bed at home since surgery. Patient has been unable to ambulate at home since surgery, and is unable to obtain assistance needed to safely function in community at this time.

## 2023-01-28 NOTE — DIETITIAN INITIAL EVALUATION ADULT - PERTINENT MEDS FT
MEDICATIONS  (STANDING):  chlorhexidine 2% Cloths 1 Application(s) Topical <User Schedule>  enoxaparin Injectable 40 milliGRAM(s) SubCutaneous every 24 hours  hydrochlorothiazide 25 milliGRAM(s) Oral daily  latanoprost 0.005% Ophthalmic Solution 1 Drop(s) Both EYES at bedtime  levothyroxine 75 MICROGram(s) Oral daily  metoprolol succinate ER 25 milliGRAM(s) Oral daily  pantoprazole    Tablet 40 milliGRAM(s) Oral before breakfast  simvastatin 20 milliGRAM(s) Oral at bedtime    MEDICATIONS  (PRN):  acetaminophen     Tablet .. 650 milliGRAM(s) Oral every 6 hours PRN Temp greater or equal to 38C (100.4F), Mild Pain (1 - 3)  melatonin 3 milliGRAM(s) Oral at bedtime PRN Insomnia  ondansetron Injectable 4 milliGRAM(s) IV Push every 8 hours PRN Nausea and/or Vomiting  oxycodone    5 mG/acetaminophen 325 mG 2 Tablet(s) Oral every 8 hours PRN Severe Pain (7 - 10)  polyethylene glycol 3350 17 Gram(s) Oral daily PRN Constipation

## 2023-01-28 NOTE — PHYSICAL THERAPY INITIAL EVALUATION ADULT - THERAPY FREQUENCY, PT EVAL
Received response from [facility]. They have denied the request for patient to have carotid ultrasound due to patient not being eligible however after running patient's insurance, patient was active with the plan. Will call to follow-up with plan to clarify.    Sanjay Burton, Med. [facility]  [facility]  
3-5x/week

## 2023-01-28 NOTE — DIETITIAN INITIAL EVALUATION ADULT - NS FNS DIET ORDER
Diet, Regular:   DASH/TLC {Sodium & Cholesterol Restricted}  Easy to Chew (EASYTOCHEW)  Low Sodium (01-27-23 @ 23:05)

## 2023-01-28 NOTE — PHYSICAL THERAPY INITIAL EVALUATION ADULT - GENERAL OBSERVATIONS, REHAB EVAL
10:46-11:11. Chart reviewed, Pt available for PT, RN notified.  Pt denies pain at rest, and is willing to participate with PT.  Pt encountered in semi-reclined position in NAD +heplock with darco shoe L foot.

## 2023-01-28 NOTE — DIETITIAN INITIAL EVALUATION ADULT - PERTINENT LABORATORY DATA
01-28    145  |  105  |  11  ----------------------------<  76  4.3   |  29  |  0.5<L>    Ca    9.7      28 Jan 2023 07:29    TPro  6.8  /  Alb  4.4  /  TBili  0.5  /  DBili  x   /  AST  19  /  ALT  15  /  AlkPhos  111  01-28

## 2023-01-28 NOTE — DIETITIAN INITIAL EVALUATION ADULT - ORAL INTAKE PTA/DIET HISTORY
as per pt adequate po intake PTA of regular diet, denies any recent weight changes. NKFA or intolerances    presently on a DASH/TLC easy to chew diet tolerating well consumes >75% of all meals

## 2023-01-29 PROCEDURE — 99231 SBSQ HOSP IP/OBS SF/LOW 25: CPT

## 2023-01-29 RX ORDER — FLUTICASONE PROPIONATE 50 MCG
1 SPRAY, SUSPENSION NASAL
Refills: 0 | Status: DISCONTINUED | OUTPATIENT
Start: 2023-01-29 | End: 2023-02-03

## 2023-01-29 RX ADMIN — Medication 25 MILLIGRAM(S): at 05:13

## 2023-01-29 RX ADMIN — Medication 1 SPRAY(S): at 17:40

## 2023-01-29 RX ADMIN — SIMVASTATIN 20 MILLIGRAM(S): 20 TABLET, FILM COATED ORAL at 21:05

## 2023-01-29 RX ADMIN — Medication 75 MICROGRAM(S): at 05:13

## 2023-01-29 RX ADMIN — PANTOPRAZOLE SODIUM 40 MILLIGRAM(S): 20 TABLET, DELAYED RELEASE ORAL at 05:13

## 2023-01-29 RX ADMIN — LATANOPROST 1 DROP(S): 0.05 SOLUTION/ DROPS OPHTHALMIC; TOPICAL at 21:06

## 2023-01-29 RX ADMIN — CHLORHEXIDINE GLUCONATE 1 APPLICATION(S): 213 SOLUTION TOPICAL at 05:14

## 2023-01-29 RX ADMIN — ENOXAPARIN SODIUM 40 MILLIGRAM(S): 100 INJECTION SUBCUTANEOUS at 05:13

## 2023-01-30 LAB
ALBUMIN SERPL ELPH-MCNC: 4 G/DL — SIGNIFICANT CHANGE UP (ref 3.5–5.2)
ALP SERPL-CCNC: 106 U/L — SIGNIFICANT CHANGE UP (ref 30–115)
ALT FLD-CCNC: 15 U/L — SIGNIFICANT CHANGE UP (ref 0–41)
ANION GAP SERPL CALC-SCNC: 15 MMOL/L — HIGH (ref 7–14)
APPEARANCE UR: CLEAR — SIGNIFICANT CHANGE UP
AST SERPL-CCNC: 19 U/L — SIGNIFICANT CHANGE UP (ref 0–41)
BACTERIA # UR AUTO: ABNORMAL
BASOPHILS # BLD AUTO: 0.05 K/UL — SIGNIFICANT CHANGE UP (ref 0–0.2)
BASOPHILS NFR BLD AUTO: 0.6 % — SIGNIFICANT CHANGE UP (ref 0–1)
BILIRUB SERPL-MCNC: 0.2 MG/DL — SIGNIFICANT CHANGE UP (ref 0.2–1.2)
BILIRUB UR-MCNC: NEGATIVE — SIGNIFICANT CHANGE UP
BUN SERPL-MCNC: 16 MG/DL — SIGNIFICANT CHANGE UP (ref 10–20)
CALCIUM SERPL-MCNC: 9.2 MG/DL — SIGNIFICANT CHANGE UP (ref 8.4–10.5)
CHLORIDE SERPL-SCNC: 97 MMOL/L — LOW (ref 98–110)
CO2 SERPL-SCNC: 22 MMOL/L — SIGNIFICANT CHANGE UP (ref 17–32)
COLOR SPEC: YELLOW — SIGNIFICANT CHANGE UP
CREAT SERPL-MCNC: 0.6 MG/DL — LOW (ref 0.7–1.5)
DIFF PNL FLD: NEGATIVE — SIGNIFICANT CHANGE UP
EGFR: 102 ML/MIN/1.73M2 — SIGNIFICANT CHANGE UP
EOSINOPHIL # BLD AUTO: 0.06 K/UL — SIGNIFICANT CHANGE UP (ref 0–0.7)
EOSINOPHIL NFR BLD AUTO: 0.7 % — SIGNIFICANT CHANGE UP (ref 0–8)
EPI CELLS # UR: ABNORMAL /HPF
GLUCOSE SERPL-MCNC: 83 MG/DL — SIGNIFICANT CHANGE UP (ref 70–99)
GLUCOSE UR QL: NEGATIVE MG/DL — SIGNIFICANT CHANGE UP
HCT VFR BLD CALC: 40.3 % — SIGNIFICANT CHANGE UP (ref 37–47)
HGB BLD-MCNC: 13.6 G/DL — SIGNIFICANT CHANGE UP (ref 12–16)
IMM GRANULOCYTES NFR BLD AUTO: 0.4 % — HIGH (ref 0.1–0.3)
KETONES UR-MCNC: NEGATIVE — SIGNIFICANT CHANGE UP
LEUKOCYTE ESTERASE UR-ACNC: ABNORMAL
LYMPHOCYTES # BLD AUTO: 0.87 K/UL — LOW (ref 1.2–3.4)
LYMPHOCYTES # BLD AUTO: 10.4 % — LOW (ref 20.5–51.1)
MCHC RBC-ENTMCNC: 26.7 PG — LOW (ref 27–31)
MCHC RBC-ENTMCNC: 33.7 G/DL — SIGNIFICANT CHANGE UP (ref 32–37)
MCV RBC AUTO: 79 FL — LOW (ref 81–99)
MONOCYTES # BLD AUTO: 1.08 K/UL — HIGH (ref 0.1–0.6)
MONOCYTES NFR BLD AUTO: 12.9 % — HIGH (ref 1.7–9.3)
NEUTROPHILS # BLD AUTO: 6.3 K/UL — SIGNIFICANT CHANGE UP (ref 1.4–6.5)
NEUTROPHILS NFR BLD AUTO: 75 % — SIGNIFICANT CHANGE UP (ref 42.2–75.2)
NITRITE UR-MCNC: NEGATIVE — SIGNIFICANT CHANGE UP
NRBC # BLD: 0 /100 WBCS — SIGNIFICANT CHANGE UP (ref 0–0)
PH UR: 7 — SIGNIFICANT CHANGE UP (ref 5–8)
PLATELET # BLD AUTO: 296 K/UL — SIGNIFICANT CHANGE UP (ref 130–400)
POTASSIUM SERPL-MCNC: 3.7 MMOL/L — SIGNIFICANT CHANGE UP (ref 3.5–5)
POTASSIUM SERPL-SCNC: 3.7 MMOL/L — SIGNIFICANT CHANGE UP (ref 3.5–5)
PROT SERPL-MCNC: 6.8 G/DL — SIGNIFICANT CHANGE UP (ref 6–8)
PROT UR-MCNC: NEGATIVE MG/DL — SIGNIFICANT CHANGE UP
RBC # BLD: 5.1 M/UL — SIGNIFICANT CHANGE UP (ref 4.2–5.4)
RBC # FLD: 15.6 % — HIGH (ref 11.5–14.5)
RBC CASTS # UR COMP ASSIST: SIGNIFICANT CHANGE UP /HPF
SARS-COV-2 RNA SPEC QL NAA+PROBE: SIGNIFICANT CHANGE UP
SODIUM SERPL-SCNC: 134 MMOL/L — LOW (ref 135–146)
SP GR SPEC: 1.02 — SIGNIFICANT CHANGE UP (ref 1.01–1.03)
UROBILINOGEN FLD QL: 0.2 MG/DL — SIGNIFICANT CHANGE UP
WBC # BLD: 8.39 K/UL — SIGNIFICANT CHANGE UP (ref 4.8–10.8)
WBC # FLD AUTO: 8.39 K/UL — SIGNIFICANT CHANGE UP (ref 4.8–10.8)
WBC UR QL: SIGNIFICANT CHANGE UP /HPF

## 2023-01-30 PROCEDURE — 99231 SBSQ HOSP IP/OBS SF/LOW 25: CPT

## 2023-01-30 PROCEDURE — 99233 SBSQ HOSP IP/OBS HIGH 50: CPT

## 2023-01-30 PROCEDURE — 99221 1ST HOSP IP/OBS SF/LOW 40: CPT

## 2023-01-30 PROCEDURE — 71045 X-RAY EXAM CHEST 1 VIEW: CPT | Mod: 26

## 2023-01-30 RX ORDER — IBUPROFEN 200 MG
600 TABLET ORAL ONCE
Refills: 0 | Status: COMPLETED | OUTPATIENT
Start: 2023-01-30 | End: 2023-01-30

## 2023-01-30 RX ADMIN — Medication 600 MILLIGRAM(S): at 13:33

## 2023-01-30 RX ADMIN — Medication 1 SPRAY(S): at 05:12

## 2023-01-30 RX ADMIN — Medication 25 MILLIGRAM(S): at 05:11

## 2023-01-30 RX ADMIN — Medication 75 MICROGRAM(S): at 05:11

## 2023-01-30 RX ADMIN — Medication 650 MILLIGRAM(S): at 08:40

## 2023-01-30 RX ADMIN — ENOXAPARIN SODIUM 40 MILLIGRAM(S): 100 INJECTION SUBCUTANEOUS at 05:12

## 2023-01-30 RX ADMIN — PANTOPRAZOLE SODIUM 40 MILLIGRAM(S): 20 TABLET, DELAYED RELEASE ORAL at 05:12

## 2023-01-30 RX ADMIN — LATANOPROST 1 DROP(S): 0.05 SOLUTION/ DROPS OPHTHALMIC; TOPICAL at 21:06

## 2023-01-30 RX ADMIN — Medication 600 MILLIGRAM(S): at 14:00

## 2023-01-30 RX ADMIN — CHLORHEXIDINE GLUCONATE 1 APPLICATION(S): 213 SOLUTION TOPICAL at 05:12

## 2023-01-30 RX ADMIN — SIMVASTATIN 20 MILLIGRAM(S): 20 TABLET, FILM COATED ORAL at 21:06

## 2023-01-30 RX ADMIN — Medication 1 SPRAY(S): at 17:02

## 2023-01-30 RX ADMIN — Medication 650 MILLIGRAM(S): at 08:19

## 2023-01-30 NOTE — CONSULT NOTE ADULT - SUBJECTIVE AND OBJECTIVE BOX
HPI:   Patient is a 62-year-old female with past medical history of HTN/ HLD / hypothy. Presenting  to the ED reporting she has a left foot procedure done by Dr. Malone on December 13, 2022 reporting since that time she has been unable to care for herself at home.  Patient lives with her mother who is in her 80s, reports that while at home she is having difficulty ambulating and is using a bedpan, her mother cannot take care of her and patient states she cannot care for herself.  Patient has seen her podiatrist today as follow-up, reports that her foot is healing appropriately, patient however reporting that she cannot go home safely and is requesting admission for rehabilitation.    Denies fever, chills, numbness/tingling, weakness, rash, warmth, discharge, chest pain, sob       PAST MEDICAL & SURGICAL HISTORY:  HTN (hypertension)  Hypercholesteremia  Glaucoma  History of surgery  tonsillectomy  H/O foot surgery  RIGHT FOOT 2022    REVIEW OF SYSTEMS: All other systems negative, unless indicated in  HPI    MEDICATIONS  (STANDING):  chlorhexidine 2% Cloths 1 Application(s) Topical <User Schedule>  enoxaparin Injectable 40 milliGRAM(s) SubCutaneous every 24 hours  fluticasone propionate 50 MICROgram(s)/spray Nasal Spray 1 Spray(s) Both Nostrils two times a day  hydrochlorothiazide 25 milliGRAM(s) Oral daily  latanoprost 0.005% Ophthalmic Solution 1 Drop(s) Both EYES at bedtime  levothyroxine 75 MICROGram(s) Oral daily  metoprolol succinate ER 25 milliGRAM(s) Oral daily  pantoprazole    Tablet 40 milliGRAM(s) Oral before breakfast  simvastatin 20 milliGRAM(s) Oral at bedtime    MEDICATIONS  (PRN):  acetaminophen     Tablet .. 650 milliGRAM(s) Oral every 6 hours PRN Temp greater or equal to 38C (100.4F), Mild Pain (1 - 3)  melatonin 3 milliGRAM(s) Oral at bedtime PRN Insomnia  ondansetron Injectable 4 milliGRAM(s) IV Push every 8 hours PRN Nausea and/or Vomiting  oxycodone    5 mG/acetaminophen 325 mG 2 Tablet(s) Oral every 8 hours PRN Severe Pain (7 - 10)  polyethylene glycol 3350 17 Gram(s) Oral daily PRN Constipation      Allergies  penicillins (Rash)  Intolerances    SOCIAL HISTORY:   Lives at home     FAMILY HISTORY:  Known health problems: none (Father, Mother)     PHYSICAL EXAM:  Vital Signs Last 24 Hrs  T(C): 37.7 (30 Jan 2023 09:29), Max: 38 (30 Jan 2023 08:17)  T(F): 99.9 (30 Jan 2023 09:29), Max: 100.4 (30 Jan 2023 08:17)  HR: 89 (30 Jan 2023 04:28) (76 - 89)  BP: 124/69 (30 Jan 2023 04:28) (118/69 - 124/69)  BP(mean): --  RR: 18 (30 Jan 2023 04:28) (18 - 18)  SpO2: --         General : NAD, obese    HEENT:  NC/AT, PERRL, EOMI, sclera clear, mucosa moist, throat clear, trachea midline, neck supple  Cardiovascular: RRR   Respiratory:  equal chest rise  Gastrointestinal soft NT/ND (+)BS    Neurology:  weakened strength & sensation  Psych: calm  Musculoskeletal:  limited stiff,      LABS/ CULTURES/ RADIOLOGY:      145  |  105  |  11  ----------------------------<  76      [01-28-23 @ 07:29]  4.3   |  29  |  0.5        Ca     9.7     [01-28-23 @ 07:29]                                
Podiatry Consult Note    Subjective:  BRAYAN FOWLERI  Seen Bedside 62y Female  .   Patient is a 62y old  Female who presents with a chief complaint of INABILITY TO AMBULATE DUE TO LEFT ANKLE OR FOOT     (28 Jan 2023 17:37)    HPI:  62-year-old female with past medical history of HTN/ HLD / hypothy. presenting to the ED reporting she has a left foot procedure done by Dr. Malone on December 13, 2022 reporting since that time she has been unable to care for herself at home.  Patient lives with her mother who is in her 80s, reports that while at home she is having difficulty ambulating and is using a bedpan, her mother cannot take care of her and patient states she cannot care for herself.  Patient has seen her podiatrist today as follow-up, reports that her foot is healing appropriately, patient however reporting that she cannot go home safely and is requesting admission for rehabilitation.      Denies fever, chills, numbness/tingling, weakness, rash, warmth, discharge, chest pain, sob (27 Jan 2023 22:39)      Past Medical History and Surgical History  PAST MEDICAL & SURGICAL HISTORY:  HTN (hypertension)      Hypercholesteremia      Glaucoma      History of surgery  tonsillectomy      H/O foot surgery  RIGHT FOOT 2022           Review of Systems:  [X] Ten point review of systems is otherwise negative except as noted     Objective:  Vital Signs Last 24 Hrs  T(C): 35.6 (28 Jan 2023 14:21), Max: 36.6 (27 Jan 2023 21:38)  T(F): 96.1 (28 Jan 2023 14:21), Max: 97.8 (27 Jan 2023 21:38)  HR: 71 (28 Jan 2023 14:21) (69 - 88)  BP: 106/64 (28 Jan 2023 14:21) (106/64 - 158/83)  BP(mean): --  RR: 16 (28 Jan 2023 14:21) (16 - 16)  SpO2: 100% (28 Jan 2023 00:22) (99% - 100%)    Parameters below as of 28 Jan 2023 00:22  Patient On (Oxygen Delivery Method): room air                            13.7   8.25  )-----------( 310      ( 28 Jan 2023 07:29 )             42.4                 01-28    145  |  105  |  11  ----------------------------<  76  4.3   |  29  |  0.5<L>    Ca    9.7      28 Jan 2023 07:29    TPro  6.8  /  Alb  4.4  /  TBili  0.5  /  DBili  x   /  AST  19  /  ALT  15  /  AlkPhos  111  01-28      Physical Exam - Lower Extremity Focused:   Derm: Healed incisions on left foot , no open lesions or ulcers  Vascular: DP and PT Pulses Diminished; Foot is Warm to Warm to the touch; Capillary Refill Time < 3 Seconds;    Neuro: Protective Sensation Diminished / Moderately Neuropathic   MSK: Pain On Palpation at Wound Site     Assessment:  s/p Left foot cavus foot reconstruction surgery 12/22    Plan:  Chart reviewed and Patient evaluated. All Questions and Concerns Addressed and Answered  XR Imaging left foot, results reviewed; hardware intact without signs of loosening or breakage  Bivalved fiberglass cast applied bedside; to remain on left foot for 2 weeks  Weight Bearing Status; strict non weight bearing left foot  Patient to be placed in rehabilitation facility for 2 weeks pending insurance authorization for inability to ambulate left foot  Stable for discharge from podiatry standpoint on Monday 1/30 to rehab   Follow up with Dr. Malone after 2 week rehab stay  Discussed Plan w/ Dr. Malone    Podiatry [phone]

## 2023-01-31 LAB
ALBUMIN SERPL ELPH-MCNC: 4.1 G/DL — SIGNIFICANT CHANGE UP (ref 3.5–5.2)
ALP SERPL-CCNC: 111 U/L — SIGNIFICANT CHANGE UP (ref 30–115)
ALT FLD-CCNC: 20 U/L — SIGNIFICANT CHANGE UP (ref 0–41)
ANION GAP SERPL CALC-SCNC: 13 MMOL/L — SIGNIFICANT CHANGE UP (ref 7–14)
AST SERPL-CCNC: 27 U/L — SIGNIFICANT CHANGE UP (ref 0–41)
BASOPHILS # BLD AUTO: 0.04 K/UL — SIGNIFICANT CHANGE UP (ref 0–0.2)
BASOPHILS NFR BLD AUTO: 0.6 % — SIGNIFICANT CHANGE UP (ref 0–1)
BILIRUB SERPL-MCNC: 0.3 MG/DL — SIGNIFICANT CHANGE UP (ref 0.2–1.2)
BUN SERPL-MCNC: 13 MG/DL — SIGNIFICANT CHANGE UP (ref 10–20)
CALCIUM SERPL-MCNC: 9 MG/DL — SIGNIFICANT CHANGE UP (ref 8.4–10.5)
CHLORIDE SERPL-SCNC: 97 MMOL/L — LOW (ref 98–110)
CO2 SERPL-SCNC: 26 MMOL/L — SIGNIFICANT CHANGE UP (ref 17–32)
CREAT SERPL-MCNC: 0.6 MG/DL — LOW (ref 0.7–1.5)
CULTURE RESULTS: SIGNIFICANT CHANGE UP
EGFR: 102 ML/MIN/1.73M2 — SIGNIFICANT CHANGE UP
EOSINOPHIL # BLD AUTO: 0.03 K/UL — SIGNIFICANT CHANGE UP (ref 0–0.7)
EOSINOPHIL NFR BLD AUTO: 0.5 % — SIGNIFICANT CHANGE UP (ref 0–8)
FLUAV H3 RNA SPEC QL NAA+PROBE: DETECTED
GLUCOSE SERPL-MCNC: 87 MG/DL — SIGNIFICANT CHANGE UP (ref 70–99)
HCT VFR BLD CALC: 42.3 % — SIGNIFICANT CHANGE UP (ref 37–47)
HGB BLD-MCNC: 14 G/DL — SIGNIFICANT CHANGE UP (ref 12–16)
IMM GRANULOCYTES NFR BLD AUTO: 0.3 % — SIGNIFICANT CHANGE UP (ref 0.1–0.3)
LYMPHOCYTES # BLD AUTO: 0.86 K/UL — LOW (ref 1.2–3.4)
LYMPHOCYTES # BLD AUTO: 13.9 % — LOW (ref 20.5–51.1)
MCHC RBC-ENTMCNC: 26.7 PG — LOW (ref 27–31)
MCHC RBC-ENTMCNC: 33.1 G/DL — SIGNIFICANT CHANGE UP (ref 32–37)
MCV RBC AUTO: 80.7 FL — LOW (ref 81–99)
MONOCYTES # BLD AUTO: 1.14 K/UL — HIGH (ref 0.1–0.6)
MONOCYTES NFR BLD AUTO: 18.4 % — HIGH (ref 1.7–9.3)
NEUTROPHILS # BLD AUTO: 4.1 K/UL — SIGNIFICANT CHANGE UP (ref 1.4–6.5)
NEUTROPHILS NFR BLD AUTO: 66.3 % — SIGNIFICANT CHANGE UP (ref 42.2–75.2)
NRBC # BLD: 0 /100 WBCS — SIGNIFICANT CHANGE UP (ref 0–0)
PLATELET # BLD AUTO: 269 K/UL — SIGNIFICANT CHANGE UP (ref 130–400)
POTASSIUM SERPL-MCNC: 4 MMOL/L — SIGNIFICANT CHANGE UP (ref 3.5–5)
POTASSIUM SERPL-SCNC: 4 MMOL/L — SIGNIFICANT CHANGE UP (ref 3.5–5)
PROCALCITONIN SERPL-MCNC: 0.07 NG/ML — SIGNIFICANT CHANGE UP (ref 0.02–0.1)
PROT SERPL-MCNC: 6.9 G/DL — SIGNIFICANT CHANGE UP (ref 6–8)
RAPID RVP RESULT: DETECTED
RBC # BLD: 5.24 M/UL — SIGNIFICANT CHANGE UP (ref 4.2–5.4)
RBC # FLD: 15.8 % — HIGH (ref 11.5–14.5)
SARS-COV-2 RNA SPEC QL NAA+PROBE: SIGNIFICANT CHANGE UP
SODIUM SERPL-SCNC: 136 MMOL/L — SIGNIFICANT CHANGE UP (ref 135–146)
SPECIMEN SOURCE: SIGNIFICANT CHANGE UP
WBC # BLD: 6.19 K/UL — SIGNIFICANT CHANGE UP (ref 4.8–10.8)
WBC # FLD AUTO: 6.19 K/UL — SIGNIFICANT CHANGE UP (ref 4.8–10.8)

## 2023-01-31 PROCEDURE — 99231 SBSQ HOSP IP/OBS SF/LOW 25: CPT

## 2023-01-31 RX ADMIN — Medication 75 MICROGRAM(S): at 05:59

## 2023-01-31 RX ADMIN — ENOXAPARIN SODIUM 40 MILLIGRAM(S): 100 INJECTION SUBCUTANEOUS at 05:58

## 2023-01-31 RX ADMIN — Medication 1 SPRAY(S): at 17:27

## 2023-01-31 RX ADMIN — LATANOPROST 1 DROP(S): 0.05 SOLUTION/ DROPS OPHTHALMIC; TOPICAL at 21:42

## 2023-01-31 RX ADMIN — Medication 25 MILLIGRAM(S): at 05:58

## 2023-01-31 RX ADMIN — CHLORHEXIDINE GLUCONATE 1 APPLICATION(S): 213 SOLUTION TOPICAL at 05:59

## 2023-01-31 RX ADMIN — SIMVASTATIN 20 MILLIGRAM(S): 20 TABLET, FILM COATED ORAL at 21:41

## 2023-01-31 RX ADMIN — Medication 1 SPRAY(S): at 05:59

## 2023-02-01 LAB
ALBUMIN SERPL ELPH-MCNC: 4.1 G/DL — SIGNIFICANT CHANGE UP (ref 3.5–5.2)
ALP SERPL-CCNC: 104 U/L — SIGNIFICANT CHANGE UP (ref 30–115)
ALT FLD-CCNC: 23 U/L — SIGNIFICANT CHANGE UP (ref 0–41)
ANION GAP SERPL CALC-SCNC: 11 MMOL/L — SIGNIFICANT CHANGE UP (ref 7–14)
AST SERPL-CCNC: 29 U/L — SIGNIFICANT CHANGE UP (ref 0–41)
BASOPHILS # BLD AUTO: 0.04 K/UL — SIGNIFICANT CHANGE UP (ref 0–0.2)
BASOPHILS NFR BLD AUTO: 1 % — SIGNIFICANT CHANGE UP (ref 0–1)
BILIRUB SERPL-MCNC: 0.2 MG/DL — SIGNIFICANT CHANGE UP (ref 0.2–1.2)
BUN SERPL-MCNC: 13 MG/DL — SIGNIFICANT CHANGE UP (ref 10–20)
CALCIUM SERPL-MCNC: 9.2 MG/DL — SIGNIFICANT CHANGE UP (ref 8.4–10.5)
CHLORIDE SERPL-SCNC: 99 MMOL/L — SIGNIFICANT CHANGE UP (ref 98–110)
CO2 SERPL-SCNC: 26 MMOL/L — SIGNIFICANT CHANGE UP (ref 17–32)
CREAT SERPL-MCNC: 0.5 MG/DL — LOW (ref 0.7–1.5)
EGFR: 107 ML/MIN/1.73M2 — SIGNIFICANT CHANGE UP
EOSINOPHIL # BLD AUTO: 0.05 K/UL — SIGNIFICANT CHANGE UP (ref 0–0.7)
EOSINOPHIL NFR BLD AUTO: 1.2 % — SIGNIFICANT CHANGE UP (ref 0–8)
GLUCOSE SERPL-MCNC: 90 MG/DL — SIGNIFICANT CHANGE UP (ref 70–99)
HCT VFR BLD CALC: 42.5 % — SIGNIFICANT CHANGE UP (ref 37–47)
HGB BLD-MCNC: 14.1 G/DL — SIGNIFICANT CHANGE UP (ref 12–16)
IMM GRANULOCYTES NFR BLD AUTO: 0.2 % — SIGNIFICANT CHANGE UP (ref 0.1–0.3)
LYMPHOCYTES # BLD AUTO: 1.42 K/UL — SIGNIFICANT CHANGE UP (ref 1.2–3.4)
LYMPHOCYTES # BLD AUTO: 35.1 % — SIGNIFICANT CHANGE UP (ref 20.5–51.1)
MCHC RBC-ENTMCNC: 26.7 PG — LOW (ref 27–31)
MCHC RBC-ENTMCNC: 33.2 G/DL — SIGNIFICANT CHANGE UP (ref 32–37)
MCV RBC AUTO: 80.3 FL — LOW (ref 81–99)
MONOCYTES # BLD AUTO: 0.89 K/UL — HIGH (ref 0.1–0.6)
MONOCYTES NFR BLD AUTO: 22 % — HIGH (ref 1.7–9.3)
NEUTROPHILS # BLD AUTO: 1.64 K/UL — SIGNIFICANT CHANGE UP (ref 1.4–6.5)
NEUTROPHILS NFR BLD AUTO: 40.5 % — LOW (ref 42.2–75.2)
NRBC # BLD: 0 /100 WBCS — SIGNIFICANT CHANGE UP (ref 0–0)
PLATELET # BLD AUTO: 259 K/UL — SIGNIFICANT CHANGE UP (ref 130–400)
POTASSIUM SERPL-MCNC: 4 MMOL/L — SIGNIFICANT CHANGE UP (ref 3.5–5)
POTASSIUM SERPL-SCNC: 4 MMOL/L — SIGNIFICANT CHANGE UP (ref 3.5–5)
PROT SERPL-MCNC: 6.7 G/DL — SIGNIFICANT CHANGE UP (ref 6–8)
RBC # BLD: 5.29 M/UL — SIGNIFICANT CHANGE UP (ref 4.2–5.4)
RBC # FLD: 15.7 % — HIGH (ref 11.5–14.5)
SODIUM SERPL-SCNC: 136 MMOL/L — SIGNIFICANT CHANGE UP (ref 135–146)
WBC # BLD: 4.05 K/UL — LOW (ref 4.8–10.8)
WBC # FLD AUTO: 4.05 K/UL — LOW (ref 4.8–10.8)

## 2023-02-01 PROCEDURE — 99231 SBSQ HOSP IP/OBS SF/LOW 25: CPT

## 2023-02-01 RX ADMIN — ENOXAPARIN SODIUM 40 MILLIGRAM(S): 100 INJECTION SUBCUTANEOUS at 05:36

## 2023-02-01 RX ADMIN — CHLORHEXIDINE GLUCONATE 1 APPLICATION(S): 213 SOLUTION TOPICAL at 05:43

## 2023-02-01 RX ADMIN — Medication 1 SPRAY(S): at 18:12

## 2023-02-01 RX ADMIN — Medication 1 SPRAY(S): at 05:44

## 2023-02-01 RX ADMIN — Medication 75 MICROGRAM(S): at 05:36

## 2023-02-01 RX ADMIN — LATANOPROST 1 DROP(S): 0.05 SOLUTION/ DROPS OPHTHALMIC; TOPICAL at 21:08

## 2023-02-01 RX ADMIN — SIMVASTATIN 20 MILLIGRAM(S): 20 TABLET, FILM COATED ORAL at 21:08

## 2023-02-01 RX ADMIN — Medication 650 MILLIGRAM(S): at 23:11

## 2023-02-01 RX ADMIN — Medication 25 MILLIGRAM(S): at 05:36

## 2023-02-02 PROCEDURE — 99232 SBSQ HOSP IP/OBS MODERATE 35: CPT

## 2023-02-02 RX ADMIN — Medication 650 MILLIGRAM(S): at 17:54

## 2023-02-02 RX ADMIN — LATANOPROST 1 DROP(S): 0.05 SOLUTION/ DROPS OPHTHALMIC; TOPICAL at 21:30

## 2023-02-02 RX ADMIN — SIMVASTATIN 20 MILLIGRAM(S): 20 TABLET, FILM COATED ORAL at 21:28

## 2023-02-02 RX ADMIN — Medication 1 SPRAY(S): at 17:50

## 2023-02-02 RX ADMIN — Medication 75 MICROGRAM(S): at 06:06

## 2023-02-02 RX ADMIN — Medication 650 MILLIGRAM(S): at 17:50

## 2023-02-02 RX ADMIN — ENOXAPARIN SODIUM 40 MILLIGRAM(S): 100 INJECTION SUBCUTANEOUS at 06:07

## 2023-02-02 RX ADMIN — Medication 25 MILLIGRAM(S): at 06:06

## 2023-02-02 RX ADMIN — Medication 1 SPRAY(S): at 06:06

## 2023-02-02 NOTE — PROGRESS NOTE ADULT - REASON FOR ADMISSION
inability to ambulate

## 2023-02-02 NOTE — PROGRESS NOTE ADULT - SUBJECTIVE AND OBJECTIVE BOX
62-year-old female with past medical history of HTN/ HLD presenting to the ED reporting she has a left foot procedure done by Dr. Malone on December 13, 2022 reporting since that time she has been unable to care for herself at home.     Today:  Seen at bedside, complains of runny nose, cough, sore throat.        REVIEW OF SYSTEMS:  Cough, runny nose, sore throat      MEDICATIONS  (STANDING):  chlorhexidine 2% Cloths 1 Application(s) Topical <User Schedule>  enoxaparin Injectable 40 milliGRAM(s) SubCutaneous every 24 hours  fluticasone propionate 50 MICROgram(s)/spray Nasal Spray 1 Spray(s) Both Nostrils two times a day  hydrochlorothiazide 25 milliGRAM(s) Oral daily  ibuprofen  Tablet. 600 milliGRAM(s) Oral once  latanoprost 0.005% Ophthalmic Solution 1 Drop(s) Both EYES at bedtime  levothyroxine 75 MICROGram(s) Oral daily  metoprolol succinate ER 25 milliGRAM(s) Oral daily  pantoprazole    Tablet 40 milliGRAM(s) Oral before breakfast  simvastatin 20 milliGRAM(s) Oral at bedtime    MEDICATIONS  (PRN):  acetaminophen     Tablet .. 650 milliGRAM(s) Oral every 6 hours PRN Temp greater or equal to 38C (100.4F), Mild Pain (1 - 3)  melatonin 3 milliGRAM(s) Oral at bedtime PRN Insomnia  ondansetron Injectable 4 milliGRAM(s) IV Push every 8 hours PRN Nausea and/or Vomiting  oxycodone    5 mG/acetaminophen 325 mG 2 Tablet(s) Oral every 8 hours PRN Severe Pain (7 - 10)  polyethylene glycol 3350 17 Gram(s) Oral daily PRN Constipation      Allergies  penicillins (Rash)        FAMILY HISTORY:  Known health problems: none (Father, Mother)        Vital Signs Last 24 Hrs  T(C): 38.3 (30 Jan 2023 13:25), Max: 38.3 (30 Jan 2023 13:25)  T(F): 101 (30 Jan 2023 13:25), Max: 101 (30 Jan 2023 13:25)  HR: 89 (30 Jan 2023 04:28) (76 - 89)  BP: 124/69 (30 Jan 2023 04:28) (118/69 - 124/69)  RR: 18 (30 Jan 2023 04:28) (18 - 18)        PHYSICAL EXAM:  GENERAL: Well-nourished, Well-developed. NAD.  HEAD: No visible or palpable bumps or hematomas. No ecchymosis behind ears B/L.  Eyes: EOMI. No asymmetry. No nystagmus. No conjunctival injection. Non-icteric sclera.  ENMT: MMM.  Neck: Supple. FROM  CVS: Normal S1,S2. No murmurs appreciated on auscultation   RESP: No use of accessory muscles. Chest rise symmetrical with good expansion. Lungs clear to auscultation B/L. No wheezing, rales, or rhonchi auscultated.  MSK: (+)L foot no swelling or deformity, FROM L foot and ankle  Skin: (+)healed incision sites, no discharge/warmth/erythema  EXT: Radial and pedal pulses present B/L. No calf tenderness or swelling B/L. No palpable cords. No pedal edema B/L.      
62-year-old female with past medical history of HTN/ HLD presenting to the ED reporting she has a left foot procedure done by Dr. Malone on December 13, 2022 reporting since that time she has been unable to care for herself at home.     Today:  Seen at bedside, no new complaints.        REVIEW OF SYSTEMS:  No new complaints      MEDICATIONS  (STANDING):  chlorhexidine 2% Cloths 1 Application(s) Topical <User Schedule>  enoxaparin Injectable 40 milliGRAM(s) SubCutaneous every 24 hours  hydrochlorothiazide 25 milliGRAM(s) Oral daily  latanoprost 0.005% Ophthalmic Solution 1 Drop(s) Both EYES at bedtime  levothyroxine 75 MICROGram(s) Oral daily  metoprolol succinate ER 25 milliGRAM(s) Oral daily  pantoprazole    Tablet 40 milliGRAM(s) Oral before breakfast  simvastatin 20 milliGRAM(s) Oral at bedtime    MEDICATIONS  (PRN):  acetaminophen     Tablet .. 650 milliGRAM(s) Oral every 6 hours PRN Temp greater or equal to 38C (100.4F), Mild Pain (1 - 3)  melatonin 3 milliGRAM(s) Oral at bedtime PRN Insomnia  ondansetron Injectable 4 milliGRAM(s) IV Push every 8 hours PRN Nausea and/or Vomiting  oxycodone    5 mG/acetaminophen 325 mG 2 Tablet(s) Oral every 8 hours PRN Severe Pain (7 - 10)  polyethylene glycol 3350 17 Gram(s) Oral daily PRN Constipation      Allergies  penicillins (Rash)        FAMILY HISTORY:  Known health problems: none (Father, Mother)        Vital Signs Last 24 Hrs  T(C): 35.6 (28 Jan 2023 14:21), Max: 36.6 (27 Jan 2023 21:38)  T(F): 96.1 (28 Jan 2023 14:21), Max: 97.8 (27 Jan 2023 21:38)  HR: 71 (28 Jan 2023 14:21) (69 - 90)  BP: 106/64 (28 Jan 2023 14:21) (106/64 - 161/88)  RR: 16 (28 Jan 2023 14:21) (16 - 17)  SpO2: 100% (28 Jan 2023 00:22) (99% - 100%)    Parameters below as of 28 Jan 2023 00:22  Patient On (Oxygen Delivery Method): room air        PHYSICAL EXAM:  GENERAL: Well-nourished, Well-developed. NAD.  HEAD: No visible or palpable bumps or hematomas. No ecchymosis behind ears B/L.  Eyes: EOMI. No asymmetry. No nystagmus. No conjunctival injection. Non-icteric sclera.  ENMT: MMM.  Neck: Supple. FROM  CVS: Normal S1,S2. No murmurs appreciated on auscultation   RESP: No use of accessory muscles. Chest rise symmetrical with good expansion. Lungs clear to auscultation B/L. No wheezing, rales, or rhonchi auscultated.  MSK: (+)L foot no swelling or deformity, FROM L foot and ankle  Skin: (+)healed incision sites, no discharge/warmth/erythema  EXT: Radial and pedal pulses present B/L. No calf tenderness or swelling B/L. No palpable cords. No pedal edema B/L.      LABS:                        13.7   8.25  )-----------( 310      ( 28 Jan 2023 07:29 )             42.4     01-28    145  |  105  |  11  ----------------------------<  76  4.3   |  29  |  0.5<L>    Ca    9.7      28 Jan 2023 07:29    TPro  6.8  /  Alb  4.4  /  TBili  0.5  /  DBili  x   /  AST  19  /  ALT  15  /  AlkPhos  111  01-28        
62-year-old female with past medical history of HTN/ HLD presenting to the ED reporting she has a left foot procedure done by Dr. Malone on December 13, 2022 reporting since that time she has been unable to care for herself at home.     Today:  Seen at bedside, no new complaints.          REVIEW OF SYSTEMS:  No new complaints      MEDICATIONS  (STANDING):  chlorhexidine 2% Cloths 1 Application(s) Topical <User Schedule>  enoxaparin Injectable 40 milliGRAM(s) SubCutaneous every 24 hours  fluticasone propionate 50 MICROgram(s)/spray Nasal Spray 1 Spray(s) Both Nostrils two times a day  hydrochlorothiazide 25 milliGRAM(s) Oral daily  latanoprost 0.005% Ophthalmic Solution 1 Drop(s) Both EYES at bedtime  levothyroxine 75 MICROGram(s) Oral daily  metoprolol succinate ER 25 milliGRAM(s) Oral daily  pantoprazole    Tablet 40 milliGRAM(s) Oral before breakfast  simvastatin 20 milliGRAM(s) Oral at bedtime    MEDICATIONS  (PRN):  acetaminophen     Tablet .. 650 milliGRAM(s) Oral every 6 hours PRN Temp greater or equal to 38C (100.4F), Mild Pain (1 - 3)  melatonin 3 milliGRAM(s) Oral at bedtime PRN Insomnia  ondansetron Injectable 4 milliGRAM(s) IV Push every 8 hours PRN Nausea and/or Vomiting  oxycodone    5 mG/acetaminophen 325 mG 2 Tablet(s) Oral every 8 hours PRN Severe Pain (7 - 10)  polyethylene glycol 3350 17 Gram(s) Oral daily PRN Constipation      Allergies  penicillins (Rash)        FAMILY HISTORY:  Known health problems: none (Father, Mother)        Vital Signs Last 24 Hrs  T(C): 36.3 (02 Feb 2023 05:04), Max: 36.4 (01 Feb 2023 13:54)  T(F): 97.3 (02 Feb 2023 05:04), Max: 97.5 (01 Feb 2023 13:54)  HR: 69 (02 Feb 2023 05:04) (69 - 73)  BP: 112/66 (02 Feb 2023 05:04) (101/65 - 115/69)  RR: 18 (01 Feb 2023 21:01) (18 - 18)        PHYSICAL EXAM:  GENERAL: Well-nourished, Well-developed. NAD.  HEAD: No visible or palpable bumps or hematomas. No ecchymosis behind ears B/L.  Eyes: EOMI. No asymmetry. No nystagmus. No conjunctival injection. Non-icteric sclera.  ENMT: MMM.  Neck: Supple. FROM  CVS: Normal S1,S2. No murmurs appreciated on auscultation   RESP: No use of accessory muscles. Chest rise symmetrical with good expansion. Lungs clear to auscultation B/L. No wheezing, rales, or rhonchi auscultated.  MSK: (+)L foot no swelling or deformity, FROM L foot and ankle  Skin: (+)healed incision sites, no discharge/warmth/erythema  EXT: Radial and pedal pulses present B/L. No calf tenderness or swelling B/L. No palpable cords. No pedal edema B/L.      LABS:                        14.1   4.05  )-----------( 259      ( 01 Feb 2023 07:13 )             42.5     02-01    136  |  99  |  13  ----------------------------<  90  4.0   |  26  |  0.5<L>    Ca    9.2      01 Feb 2023 07:13    TPro  6.7  /  Alb  4.1  /  TBili  0.2  /  DBili  x   /  AST  29  /  ALT  23  /  AlkPhos  104  02-01        
62-year-old female with past medical history of HTN/ HLD presenting to the ED reporting she has a left foot procedure done by Dr. Malone on 2022 reporting since that time she has been unable to care for herself at home.     Today:  Seen at bedside, no overnight events.              REVIEW OF SYSTEMS:  No new complaints      MEDICATIONS  (STANDING):  chlorhexidine 2% Cloths 1 Application(s) Topical <User Schedule>  enoxaparin Injectable 40 milliGRAM(s) SubCutaneous every 24 hours  fluticasone propionate 50 MICROgram(s)/spray Nasal Spray 1 Spray(s) Both Nostrils two times a day  hydrochlorothiazide 25 milliGRAM(s) Oral daily  latanoprost 0.005% Ophthalmic Solution 1 Drop(s) Both EYES at bedtime  levothyroxine 75 MICROGram(s) Oral daily  metoprolol succinate ER 25 milliGRAM(s) Oral daily  pantoprazole    Tablet 40 milliGRAM(s) Oral before breakfast  simvastatin 20 milliGRAM(s) Oral at bedtime    MEDICATIONS  (PRN):  acetaminophen     Tablet .. 650 milliGRAM(s) Oral every 6 hours PRN Temp greater or equal to 38C (100.4F), Mild Pain (1 - 3)  melatonin 3 milliGRAM(s) Oral at bedtime PRN Insomnia  ondansetron Injectable 4 milliGRAM(s) IV Push every 8 hours PRN Nausea and/or Vomiting  oxycodone    5 mG/acetaminophen 325 mG 2 Tablet(s) Oral every 8 hours PRN Severe Pain (7 - 10)  polyethylene glycol 3350 17 Gram(s) Oral daily PRN Constipation      Allergies  penicillins (Rash)        FAMILY HISTORY:  Known health problems: none (Father, Mother)        Vital Signs Last 24 Hrs  T(C): 37.5 (2023 05:23), Max: 38.3 (2023 13:25)  T(F): 99.5 (2023 05:23), Max: 101 (2023 13:25)  HR: 92 (2023 05:23) (80 - 108)  BP: 132/71 (2023 05:23) (112/69 - 133/80)  RR: 18 (2023 05:23) (18 - 18)        PHYSICAL EXAM:  GENERAL: Well-nourished, Well-developed. NAD.  HEAD: No visible or palpable bumps or hematomas. No ecchymosis behind ears B/L.  Eyes: EOMI. No asymmetry. No nystagmus. No conjunctival injection. Non-icteric sclera.  ENMT: MMM.  Neck: Supple. FROM  CVS: Normal S1,S2. No murmurs appreciated on auscultation   RESP: No use of accessory muscles. Chest rise symmetrical with good expansion. Lungs clear to auscultation B/L. No wheezing, rales, or rhonchi auscultated.  MSK: (+)L foot no swelling or deformity, FROM L foot and ankle  Skin: (+)healed incision sites, no discharge/warmth/erythema  EXT: Radial and pedal pulses present B/L. No calf tenderness or swelling B/L. No palpable cords. No pedal edema B/L.      LABS:                        14.0   6.19  )-----------( 269      ( 2023 08:09 )             42.3         136  |  97<L>  |  13  ----------------------------<  87  4.0   |  26  |  0.6<L>    Ca    9.0      2023 08:09    TPro  6.9  /  Alb  4.1  /  TBili  0.3  /  DBili  x   /  AST  27  /  ALT  20  /  AlkPhos  111        Urinalysis Basic - ( 2023 14:13 )    Color: Yellow / Appearance: Clear / S.020 / pH: x  Gluc: x / Ketone: Negative  / Bili: Negative / Urobili: 0.2 mg/dL   Blood: x / Protein: Negative mg/dL / Nitrite: Negative   Leuk Esterase: Trace / RBC: 1-2 /HPF / WBC 1-2 /HPF   Sq Epi: x / Non Sq Epi: Occasional /HPF / Bacteria: Few        
62-year-old female with past medical history of HTN/ HLD presenting to the ED reporting she has a left foot procedure done by Dr. Malone on December 13, 2022 reporting since that time she has been unable to care for herself at home.     Today:  Seen at bedside, no new complaints.            REVIEW OF SYSTEMS:  No new complaints    MEDICATIONS  (STANDING):  chlorhexidine 2% Cloths 1 Application(s) Topical <User Schedule>  enoxaparin Injectable 40 milliGRAM(s) SubCutaneous every 24 hours  fluticasone propionate 50 MICROgram(s)/spray Nasal Spray 1 Spray(s) Both Nostrils two times a day  hydrochlorothiazide 25 milliGRAM(s) Oral daily  latanoprost 0.005% Ophthalmic Solution 1 Drop(s) Both EYES at bedtime  levothyroxine 75 MICROGram(s) Oral daily  metoprolol succinate ER 25 milliGRAM(s) Oral daily  pantoprazole    Tablet 40 milliGRAM(s) Oral before breakfast  simvastatin 20 milliGRAM(s) Oral at bedtime    MEDICATIONS  (PRN):  acetaminophen     Tablet .. 650 milliGRAM(s) Oral every 6 hours PRN Temp greater or equal to 38C (100.4F), Mild Pain (1 - 3)  melatonin 3 milliGRAM(s) Oral at bedtime PRN Insomnia  ondansetron Injectable 4 milliGRAM(s) IV Push every 8 hours PRN Nausea and/or Vomiting  oxycodone    5 mG/acetaminophen 325 mG 2 Tablet(s) Oral every 8 hours PRN Severe Pain (7 - 10)  polyethylene glycol 3350 17 Gram(s) Oral daily PRN Constipation      Allergies  penicillins (Rash)      FAMILY HISTORY:  Known health problems: none (Father, Mother)        Vital Signs Last 24 Hrs  T(C): 36.4 (01 Feb 2023 13:54), Max: 36.4 (01 Feb 2023 13:54)  T(F): 97.5 (01 Feb 2023 13:54), Max: 97.5 (01 Feb 2023 13:54)  HR: 73 (01 Feb 2023 13:54) (66 - 75)  BP: 115/69 (01 Feb 2023 13:54) (102/55 - 115/69)  RR: 18 (01 Feb 2023 13:54) (18 - 18)  SpO2: 94% (31 Jan 2023 20:30) (94% - 94%)        PHYSICAL EXAM:  GENERAL: Well-nourished, Well-developed. NAD.  HEAD: No visible or palpable bumps or hematomas. No ecchymosis behind ears B/L.  Eyes: EOMI. No asymmetry. No nystagmus. No conjunctival injection. Non-icteric sclera.  ENMT: MMM.  Neck: Supple. FROM  CVS: Normal S1,S2. No murmurs appreciated on auscultation   RESP: No use of accessory muscles. Chest rise symmetrical with good expansion. Lungs clear to auscultation B/L. No wheezing, rales, or rhonchi auscultated.  MSK: (+)L foot no swelling or deformity, FROM L foot and ankle  Skin: (+)healed incision sites, no discharge/warmth/erythema  EXT: Radial and pedal pulses present B/L. No calf tenderness or swelling B/L. No palpable cords. No pedal edema B/L.      LABS:                        14.1   4.05  )-----------( 259      ( 01 Feb 2023 07:13 )             42.5     02-01    136  |  99  |  13  ----------------------------<  90  4.0   |  26  |  0.5<L>    Ca    9.2      01 Feb 2023 07:13    TPro  6.7  /  Alb  4.1  /  TBili  0.2  /  DBili  x   /  AST  29  /  ALT  23  /  AlkPhos  104  02-01          RADIOLOGY & ADDITIONAL STUDIES:
62-year-old female with past medical history of HTN/ HLD presenting to the ED reporting she has a left foot procedure done by Dr. Malone on December 13, 2022 reporting since that time she has been unable to care for herself at home.     Today:  Seen at bedside, no new complaints.          REVIEW OF SYSTEMS:  No new complaints      MEDICATIONS  (STANDING):  chlorhexidine 2% Cloths 1 Application(s) Topical <User Schedule>  enoxaparin Injectable 40 milliGRAM(s) SubCutaneous every 24 hours  hydrochlorothiazide 25 milliGRAM(s) Oral daily  latanoprost 0.005% Ophthalmic Solution 1 Drop(s) Both EYES at bedtime  levothyroxine 75 MICROGram(s) Oral daily  metoprolol succinate ER 25 milliGRAM(s) Oral daily  pantoprazole    Tablet 40 milliGRAM(s) Oral before breakfast  simvastatin 20 milliGRAM(s) Oral at bedtime    MEDICATIONS  (PRN):  acetaminophen     Tablet .. 650 milliGRAM(s) Oral every 6 hours PRN Temp greater or equal to 38C (100.4F), Mild Pain (1 - 3)  melatonin 3 milliGRAM(s) Oral at bedtime PRN Insomnia  ondansetron Injectable 4 milliGRAM(s) IV Push every 8 hours PRN Nausea and/or Vomiting  oxycodone    5 mG/acetaminophen 325 mG 2 Tablet(s) Oral every 8 hours PRN Severe Pain (7 - 10)  polyethylene glycol 3350 17 Gram(s) Oral daily PRN Constipation      Allergies  penicillins (Rash)      FAMILY HISTORY:  Known health problems: none (Father, Mother)        Vital Signs Last 24 Hrs  T(C): 35.6 (29 Jan 2023 04:44), Max: 36.1 (28 Jan 2023 20:57)  T(F): 96.1 (29 Jan 2023 04:44), Max: 97 (28 Jan 2023 20:57)  HR: 66 (29 Jan 2023 04:44) (62 - 71)  BP: 131/69 (29 Jan 2023 04:44) (106/64 - 131/69)  RR: 18 (29 Jan 2023 04:44) (16 - 18)          PHYSICAL EXAM:  GENERAL: Well-nourished, Well-developed. NAD.  HEAD: No visible or palpable bumps or hematomas. No ecchymosis behind ears B/L.  Eyes: EOMI. No asymmetry. No nystagmus. No conjunctival injection. Non-icteric sclera.  ENMT: MMM.  Neck: Supple. FROM  CVS: Normal S1,S2. No murmurs appreciated on auscultation   RESP: No use of accessory muscles. Chest rise symmetrical with good expansion. Lungs clear to auscultation B/L. No wheezing, rales, or rhonchi auscultated.  MSK: (+)L foot no swelling or deformity, FROM L foot and ankle  Skin: (+)healed incision sites, no discharge/warmth/erythema  EXT: Radial and pedal pulses present B/L. No calf tenderness or swelling B/L. No palpable cords. No pedal edema B/L.      LABS:                        13.7   8.25  )-----------( 310      ( 28 Jan 2023 07:29 )             42.4     01-28    145  |  105  |  11  ----------------------------<  76  4.3   |  29  |  0.5<L>    Ca    9.7      28 Jan 2023 07:29    TPro  6.8  /  Alb  4.4  /  TBili  0.5  /  DBili  x   /  AST  19  /  ALT  15  /  AlkPhos  111  01-28

## 2023-02-02 NOTE — PROGRESS NOTE ADULT - ASSESSMENT
62-year-old female with past medical history of HTN/ HLD presenting to the ED reporting she has a left foot procedure done by Dr. Malone on December 13, 2022 reporting since that time she has been unable to care for herself at home.     # Unable to ambulate due to left foot pain h/o surgery 12/13/2022  # failure to thrive   - pt consult   - podiatry consult   - pain meds prn   - social service   - dvt/ ppi ppx   - LWC for now until eval by podiatry   -  consult    #skin tear over coccyx present on admission   wound care consult    # HTN  - low sodium diet  - monitor vitals   - c/w home meds    # HLD  - c/w statin   - low fat diet     # Hypothyroidism   - c/w synthroid       DC to STR
62-year-old female with past medical history of HTN/ HLD presenting to the ED reporting she has a left foot procedure done by Dr. Malone on December 13, 2022 reporting since that time she has been unable to care for herself at home.     #Fever  -Unclear source  -COVID neg  -Pt complains of cough, sore throat; consistent with viral URI so will hold off on abx  -Follow blood Cx, RVP swab, procal  -CXR, UA normal  -Repeat CBC-no leukocytosis    # Unable to ambulate due to left foot pain h/o surgery 12/13/2022  # failure to thrive   - pt consult   - podiatry consult appreciated-NON WEIGHT BEARING  - pain meds prn   -DC to STR    #skin tear over coccyx present on admission   wound care consult    # HTN  - low sodium diet  - monitor vitals   - c/w home meds    # HLD  - c/w statin   - low fat diet     # Hypothyroidism   - c/w synthroid       DC to STR once work up for fever complete
62-year-old female with past medical history of HTN/ HLD presenting to the ED reporting she has a left foot procedure done by Dr. Malone on December 13, 2022 reporting since that time she has been unable to care for herself at home.     #Flu:  -COVID neg  -Pt complains of cough, sore throat; mild symptoms, no need for Tamiflu  -rest of infectious work up neg    # Unable to ambulate due to left foot pain h/o surgery 12/13/2022  # failure to thrive   - pt consult   - podiatry consult appreciated-NON WEIGHT BEARING  - pain meds prn   -DC to STR    #skin tear over coccyx present on admission   wound care consult    # HTN  - low sodium diet  - monitor vitals   - c/w home meds    # HLD  - c/w statin   - low fat diet     # Hypothyroidism   - c/w synthroid       DC to STR
62-year-old female with past medical history of HTN/ HLD presenting to the ED reporting she has a left foot procedure done by Dr. Malone on December 13, 2022 reporting since that time she has been unable to care for herself at home.     #Flu:  -Tested + while inpatient  -COVID neg  -symptoms resolved, no need for Tamiflu  -rest of infectious work up neg    # Unable to ambulate due to left foot pain h/o surgery 12/13/2022  # failure to thrive   - pt consult   - podiatry consult appreciated-NON WEIGHT BEARING  - pain meds prn   -DC to STR    #skin tear over coccyx present on admission   wound care consult appreciated    # HTN  - low sodium diet  - monitor vitals   - c/w home meds    # HLD  - c/w statin   - low fat diet     # Hypothyroidism   - c/w synthroid       DC to STR-pending  
62-year-old female with past medical history of HTN/ HLD presenting to the ED reporting she has a left foot procedure done by Dr. Malone on December 13, 2022 reporting since that time she has been unable to care for herself at home.     # Unable to ambulate due to left foot pain h/o surgery 12/13/2022  # failure to thrive   - pt consult   - podiatry consult appreciated-NON WEIGHT BEARING  - pain meds prn   -DC to STR    #skin tear over coccyx present on admission   wound care consult    # HTN  - low sodium diet  - monitor vitals   - c/w home meds    # HLD  - c/w statin   - low fat diet     # Hypothyroidism   - c/w synthroid       DC to STR  
62-year-old female with past medical history of HTN/ HLD presenting to the ED reporting she has a left foot procedure done by Dr. Malone on December 13, 2022 reporting since that time she has been unable to care for herself at home.     #Fever  -Unclear source  -COVID neg yesterday  -Pt complains of cough, sore throat; consistent with viral URI so will hold off on abx  -Will send UA, UCx, blood Cx, get CXR, RVP swab, procal  -Repeat CBC    # Unable to ambulate due to left foot pain h/o surgery 12/13/2022  # failure to thrive   - pt consult   - podiatry consult appreciated-NON WEIGHT BEARING  - pain meds prn   -DC to STR    #skin tear over coccyx present on admission   wound care consult    # HTN  - low sodium diet  - monitor vitals   - c/w home meds    # HLD  - c/w statin   - low fat diet     # Hypothyroidism   - c/w synthroid       DC to STR once work up for fever complete

## 2023-02-03 ENCOUNTER — TRANSCRIPTION ENCOUNTER (OUTPATIENT)
Age: 63
End: 2023-02-03

## 2023-02-03 VITALS
HEART RATE: 68 BPM | DIASTOLIC BLOOD PRESSURE: 63 MMHG | SYSTOLIC BLOOD PRESSURE: 100 MMHG | RESPIRATION RATE: 18 BRPM | OXYGEN SATURATION: 96 % | TEMPERATURE: 97 F

## 2023-02-03 LAB — SARS-COV-2 RNA SPEC QL NAA+PROBE: SIGNIFICANT CHANGE UP

## 2023-02-03 PROCEDURE — 99239 HOSP IP/OBS DSCHRG MGMT >30: CPT

## 2023-02-03 RX ORDER — METOPROLOL TARTRATE 50 MG
1 TABLET ORAL
Qty: 0 | Refills: 0 | DISCHARGE

## 2023-02-03 RX ORDER — HYDROCHLOROTHIAZIDE 25 MG
1 TABLET ORAL
Qty: 0 | Refills: 0 | DISCHARGE

## 2023-02-03 RX ORDER — SIMVASTATIN 20 MG/1
1 TABLET, FILM COATED ORAL
Qty: 0 | Refills: 0 | DISCHARGE

## 2023-02-03 RX ORDER — OXYCODONE AND ACETAMINOPHEN 5; 325 MG/1; MG/1
2 TABLET ORAL
Qty: 0 | Refills: 0 | DISCHARGE
Start: 2023-02-03

## 2023-02-03 RX ORDER — LANOLIN ALCOHOL/MO/W.PET/CERES
1 CREAM (GRAM) TOPICAL
Qty: 0 | Refills: 0 | DISCHARGE
Start: 2023-02-03

## 2023-02-03 RX ORDER — SIMVASTATIN 20 MG/1
1 TABLET, FILM COATED ORAL
Qty: 0 | Refills: 0 | DISCHARGE
Start: 2023-02-03

## 2023-02-03 RX ORDER — METOPROLOL TARTRATE 50 MG
1 TABLET ORAL
Refills: 0 | DISCHARGE
Start: 2023-02-03

## 2023-02-03 RX ORDER — POLYETHYLENE GLYCOL 3350 17 G/17G
17 POWDER, FOR SOLUTION ORAL
Qty: 0 | Refills: 0 | DISCHARGE
Start: 2023-02-03

## 2023-02-03 RX ORDER — HYDROCHLOROTHIAZIDE 25 MG
1 TABLET ORAL
Qty: 0 | Refills: 0 | DISCHARGE
Start: 2023-02-03

## 2023-02-03 RX ADMIN — Medication 25 MILLIGRAM(S): at 05:50

## 2023-02-03 RX ADMIN — Medication 650 MILLIGRAM(S): at 16:52

## 2023-02-03 RX ADMIN — Medication 75 MICROGRAM(S): at 05:50

## 2023-02-03 RX ADMIN — Medication 650 MILLIGRAM(S): at 17:37

## 2023-02-03 RX ADMIN — Medication 1 SPRAY(S): at 05:50

## 2023-02-03 RX ADMIN — Medication 1 SPRAY(S): at 17:39

## 2023-02-03 RX ADMIN — ENOXAPARIN SODIUM 40 MILLIGRAM(S): 100 INJECTION SUBCUTANEOUS at 05:49

## 2023-02-03 NOTE — DISCHARGE NOTE PROVIDER - HOSPITAL COURSE
62-year-old female with past medical history of HTN/ HLD / hypothyroidism presented to the ED reporting she has a left foot procedure done by Dr. Malone on December 13, 2022 reporting since that time she has been unable to care for herself at home and will be requiring rehabilitation. Patient was seen by podiatry and placed in a fiberglass bivalve cast. Patient is not to be weight bearing on the left lower extremity. Outpatient follow up with Dr. Malone 2 weeks post discharge. Patient was also noted to have  b/l buttock ulceration/skin tear. Patient is to be repositioned frequently. Wound should be cleansed with soap and water, and barrier cream applied. Patient was found to be positive for Influenza. Symptoms have remained mild and there has been no indication for tamiflu at this time. Patient is stable and ready for discharge.        62-year-old female with past medical history of HTN/ HLD / hypothyroidism presented to the ED reporting she has a left foot procedure done by Dr. Malone on December 13, 2022 reporting since that time she has been unable to care for herself at home and will be requiring rehabilitation. Patient was seen by podiatry and placed in a fiberglass bivalve cast. Patient is not to be weight bearing on the left lower extremity. Outpatient follow up with Dr. Malone 2 weeks post discharge. Patient was also noted to have  b/l buttock ulceration/skin tear. Patient is to be repositioned frequently. Wound should be cleansed with soap and water, and barrier cream applied. Patient was found to be positive for Influenza. Symptoms have remained mild and there has been no indication for tamiflu at this time. Patient is stable and ready for discharge.       < from: Xray Foot AP + Lateral + Oblique, Left (01.27.23 @ 21:27) >  Findings/impression:  Unchanged fixation hardware at calcaneus, first metatarsal, first   proximal to distal phalanx, and distal second third and fourth   metatarsals. Unchanged sclerotic appearance of the second third and   fourth proximal and middle phalanges, may be related to prior hardware   placement. Generalized osteopenia. Unchanged calcaneus deformity.  No radiographic evidence of acute fracture.  Lisfranc joint is intact.                 62-year-old female with past medical history of HTN/ HLD / hypothyroidism presented to the ED reporting she has a left foot procedure done by Dr. Malone on December 13, 2022 reporting since that time she has been unable to care for herself at home and will be requiring rehabilitation. Patient was seen by podiatry and placed in a fiberglass bivalve cast. Patient is not to be weight bearing on the left lower extremity. Outpatient follow up with Dr. Malone 2 weeks post discharge. Patient was also noted to have  b/l buttock ulceration/skin tear for which wound care was consulted for and recommended frequent repositioning. Wound should be cleansed with soap and water, and barrier cream applied. Patient was found to be positive for Influenza. Symptoms have remained mild and there has been no indication for tamiflu at this time. Patient is stable and ready for discharge.

## 2023-02-03 NOTE — DISCHARGE NOTE PROVIDER - CARE PROVIDER_API CALL
Hernandez Malone  SURGERY  [address] [place] [address]  Phone: [phone]  Fax: [phone]  Follow Up Time: 2 weeks   Hernandez Malone  SURGERY  [address] [place] [address]  Phone: [phone]  Fax: [phone]  Follow Up Time: 2 weeks    [place] [address]  Phone: [phone]  Fax: [phone]  Follow Up Time:

## 2023-02-03 NOTE — DISCHARGE NOTE NURSING/CASE MANAGEMENT/SOCIAL WORK - PATIENT PORTAL LINK FT
You can access the FollowMyHealth Patient Portal offered by [facility] by registering at the following website: [email or web address]. By joining [facility]’s FollowMyHealth portal, you will also be able to view your health information using other applications (apps) compatible with our system.

## 2023-02-03 NOTE — DISCHARGE NOTE PROVIDER - NSDCCPCAREPLAN_GEN_ALL_CORE_FT
PRINCIPAL DISCHARGE DIAGNOSIS  Diagnosis: Cavus deformity  Assessment and Plan of Treatment: You underwent Left foot cavus foot reconstruction on December 13. Please follow up with Podiatry regarding the procedure      SECONDARY DISCHARGE DIAGNOSES  Diagnosis: Influenza  Assessment and Plan of Treatment: Your symptoms were mild and did not require Tamiflu

## 2023-02-03 NOTE — DISCHARGE NOTE NURSING/CASE MANAGEMENT/SOCIAL WORK - NSDCPEFALRISK_GEN_ALL_CORE
For information on Fall & Injury Prevention, visit: [email or web address] OR  [email or web address] OR  https://www.cdc.gov/steadi/patient.html

## 2023-02-03 NOTE — DISCHARGE NOTE PROVIDER - NSDCMRMEDTOKEN_GEN_ALL_CORE_FT
hydroCHLOROthiazide 25 mg oral tablet: 1 tab(s) orally once a day  latanoprost 0.005% ophthalmic solution: 1 drop(s) to each affected eye once a day (in the evening)  levothyroxine 75 mcg (0.075 mg) oral tablet: 1 tab(s) orally once a day  metoprolol succinate 25 mg oral capsule, extended release: 1 cap(s) orally once a day  simvastatin 20 mg oral tablet: 1 tab(s) orally once a day (at bedtime)   hydroCHLOROthiazide 25 mg oral tablet: 1 tab(s) orally once a day  latanoprost 0.005% ophthalmic solution: 1 drop(s) to each affected eye once a day (in the evening)  levothyroxine 75 mcg (0.075 mg) oral tablet: 1 tab(s) orally once a day  melatonin 3 mg oral tablet: 1 tab(s) orally once a day (at bedtime), As needed, Insomnia  metoprolol succinate 25 mg oral tablet, extended release: 1 tab(s) orally once a day  oxycodone-acetaminophen 5 mg-325 mg oral tablet: 2 tab(s) orally every 8 hours, As needed, Severe Pain (7 - 10)  polyethylene glycol 3350 oral powder for reconstitution: 17 gram(s) orally once a day, As needed, Constipation  simvastatin 20 mg oral tablet: 1 tab(s) orally once a day (at bedtime)

## 2023-02-04 LAB
CULTURE RESULTS: SIGNIFICANT CHANGE UP
CULTURE RESULTS: SIGNIFICANT CHANGE UP
SPECIMEN SOURCE: SIGNIFICANT CHANGE UP
SPECIMEN SOURCE: SIGNIFICANT CHANGE UP

## 2023-02-08 DIAGNOSIS — L98.419 NON-PRESSURE CHRONIC ULCER OF BUTTOCK WITH UNSPECIFIED SEVERITY: ICD-10-CM

## 2023-02-08 DIAGNOSIS — I10 ESSENTIAL (PRIMARY) HYPERTENSION: ICD-10-CM

## 2023-02-08 DIAGNOSIS — H40.9 UNSPECIFIED GLAUCOMA: ICD-10-CM

## 2023-02-08 DIAGNOSIS — Z20.822 CONTACT WITH AND (SUSPECTED) EXPOSURE TO COVID-19: ICD-10-CM

## 2023-02-08 DIAGNOSIS — R26.2 DIFFICULTY IN WALKING, NOT ELSEWHERE CLASSIFIED: ICD-10-CM

## 2023-02-08 DIAGNOSIS — E78.5 HYPERLIPIDEMIA, UNSPECIFIED: ICD-10-CM

## 2023-02-08 DIAGNOSIS — E03.9 HYPOTHYROIDISM, UNSPECIFIED: ICD-10-CM

## 2023-02-08 DIAGNOSIS — Z88.0 ALLERGY STATUS TO PENICILLIN: ICD-10-CM

## 2023-02-08 DIAGNOSIS — Q66.72 CONGENITAL PES CAVUS, LEFT FOOT: ICD-10-CM

## 2023-02-08 DIAGNOSIS — R62.7 ADULT FAILURE TO THRIVE: ICD-10-CM

## 2023-02-08 DIAGNOSIS — J11.1 INFLUENZA DUE TO UNIDENTIFIED INFLUENZA VIRUS WITH OTHER RESPIRATORY MANIFESTATIONS: ICD-10-CM

## 2023-02-08 DIAGNOSIS — Z79.890 HORMONE REPLACEMENT THERAPY: ICD-10-CM

## 2023-06-05 ENCOUNTER — OUTPATIENT (OUTPATIENT)
Dept: OUTPATIENT SERVICES | Facility: HOSPITAL | Age: 63
LOS: 1 days | End: 2023-06-05
Payer: MEDICAID

## 2023-06-05 DIAGNOSIS — Z98.890 OTHER SPECIFIED POSTPROCEDURAL STATES: Chronic | ICD-10-CM

## 2023-06-05 DIAGNOSIS — Z00.00 ENCOUNTER FOR GENERAL ADULT MEDICAL EXAMINATION WITHOUT ABNORMAL FINDINGS: ICD-10-CM

## 2023-06-05 DIAGNOSIS — M21.70 UNEQUAL LIMB LENGTH (ACQUIRED), UNSPECIFIED SITE: ICD-10-CM

## 2023-06-05 PROCEDURE — 77073 BONE LENGTH STUDIES: CPT | Mod: 26

## 2023-06-05 PROCEDURE — 77073 BONE LENGTH STUDIES: CPT

## 2023-06-06 DIAGNOSIS — M21.70 UNEQUAL LIMB LENGTH (ACQUIRED), UNSPECIFIED SITE: ICD-10-CM

## 2023-06-23 ENCOUNTER — OUTPATIENT (OUTPATIENT)
Dept: OUTPATIENT SERVICES | Facility: HOSPITAL | Age: 63
LOS: 1 days | End: 2023-06-23
Payer: MEDICAID

## 2023-06-23 DIAGNOSIS — Z12.31 ENCOUNTER FOR SCREENING MAMMOGRAM FOR MALIGNANT NEOPLASM OF BREAST: ICD-10-CM

## 2023-06-23 DIAGNOSIS — Z98.890 OTHER SPECIFIED POSTPROCEDURAL STATES: Chronic | ICD-10-CM

## 2023-06-23 DIAGNOSIS — Z00.8 ENCOUNTER FOR OTHER GENERAL EXAMINATION: ICD-10-CM

## 2023-06-23 PROCEDURE — 77067 SCR MAMMO BI INCL CAD: CPT

## 2023-06-23 PROCEDURE — 77063 BREAST TOMOSYNTHESIS BI: CPT | Mod: 26

## 2023-06-23 PROCEDURE — 77067 SCR MAMMO BI INCL CAD: CPT | Mod: 26

## 2023-06-23 PROCEDURE — 77063 BREAST TOMOSYNTHESIS BI: CPT

## 2023-06-24 DIAGNOSIS — Z12.31 ENCOUNTER FOR SCREENING MAMMOGRAM FOR MALIGNANT NEOPLASM OF BREAST: ICD-10-CM

## 2023-07-01 ENCOUNTER — OUTPATIENT (OUTPATIENT)
Dept: OUTPATIENT SERVICES | Facility: HOSPITAL | Age: 63
LOS: 1 days | End: 2023-07-01
Payer: MEDICAID

## 2023-07-01 DIAGNOSIS — Z98.890 OTHER SPECIFIED POSTPROCEDURAL STATES: Chronic | ICD-10-CM

## 2023-07-01 DIAGNOSIS — R92.8 OTHER ABNORMAL AND INCONCLUSIVE FINDINGS ON DIAGNOSTIC IMAGING OF BREAST: ICD-10-CM

## 2023-07-01 PROCEDURE — G0279: CPT

## 2023-07-01 PROCEDURE — 77065 DX MAMMO INCL CAD UNI: CPT | Mod: 26,LT

## 2023-07-01 PROCEDURE — 77065 DX MAMMO INCL CAD UNI: CPT | Mod: LT

## 2023-07-01 PROCEDURE — 76642 ULTRASOUND BREAST LIMITED: CPT | Mod: 26,LT

## 2023-07-01 PROCEDURE — 76642 ULTRASOUND BREAST LIMITED: CPT | Mod: LT

## 2023-07-01 PROCEDURE — 77061 BREAST TOMOSYNTHESIS UNI: CPT | Mod: 26

## 2023-07-02 DIAGNOSIS — R92.8 OTHER ABNORMAL AND INCONCLUSIVE FINDINGS ON DIAGNOSTIC IMAGING OF BREAST: ICD-10-CM

## 2023-07-07 ENCOUNTER — OUTPATIENT (OUTPATIENT)
Dept: OUTPATIENT SERVICES | Facility: HOSPITAL | Age: 63
LOS: 1 days | End: 2023-07-07
Payer: MEDICAID

## 2023-07-07 DIAGNOSIS — Z98.890 OTHER SPECIFIED POSTPROCEDURAL STATES: Chronic | ICD-10-CM

## 2023-07-07 DIAGNOSIS — R92.8 OTHER ABNORMAL AND INCONCLUSIVE FINDINGS ON DIAGNOSTIC IMAGING OF BREAST: ICD-10-CM

## 2023-07-07 PROCEDURE — 88305 TISSUE EXAM BY PATHOLOGIST: CPT

## 2023-07-07 PROCEDURE — 19081 BX BREAST 1ST LESION STRTCTC: CPT | Mod: LT

## 2023-07-07 PROCEDURE — 76098 X-RAY EXAM SURGICAL SPECIMEN: CPT | Mod: 59

## 2023-07-07 PROCEDURE — 76098 X-RAY EXAM SURGICAL SPECIMEN: CPT | Mod: 26

## 2023-07-07 PROCEDURE — A4648: CPT

## 2023-07-07 PROCEDURE — 88305 TISSUE EXAM BY PATHOLOGIST: CPT | Mod: 26

## 2023-07-10 LAB — SURGICAL PATHOLOGY STUDY: SIGNIFICANT CHANGE UP

## 2023-07-11 DIAGNOSIS — N60.42 MAMMARY DUCT ECTASIA OF LEFT BREAST: ICD-10-CM

## 2023-07-11 DIAGNOSIS — N60.82 OTHER BENIGN MAMMARY DYSPLASIAS OF LEFT BREAST: ICD-10-CM

## 2023-07-11 DIAGNOSIS — N63.20 UNSPECIFIED LUMP IN THE LEFT BREAST, UNSPECIFIED QUADRANT: ICD-10-CM

## 2023-07-11 DIAGNOSIS — R92.0 MAMMOGRAPHIC MICROCALCIFICATION FOUND ON DIAGNOSTIC IMAGING OF BREAST: ICD-10-CM

## 2023-08-09 ENCOUNTER — OUTPATIENT (OUTPATIENT)
Dept: OUTPATIENT SERVICES | Facility: HOSPITAL | Age: 63
LOS: 1 days | End: 2023-08-09
Payer: MEDICAID

## 2023-08-09 DIAGNOSIS — D25.0 SUBMUCOUS LEIOMYOMA OF UTERUS: ICD-10-CM

## 2023-08-09 DIAGNOSIS — Z98.890 OTHER SPECIFIED POSTPROCEDURAL STATES: Chronic | ICD-10-CM

## 2023-08-09 DIAGNOSIS — Z12.73 ENCOUNTER FOR SCREENING FOR MALIGNANT NEOPLASM OF OVARY: ICD-10-CM

## 2023-08-09 PROCEDURE — 76830 TRANSVAGINAL US NON-OB: CPT | Mod: 26

## 2023-08-09 PROCEDURE — 76830 TRANSVAGINAL US NON-OB: CPT

## 2023-08-10 DIAGNOSIS — D25.0 SUBMUCOUS LEIOMYOMA OF UTERUS: ICD-10-CM

## 2023-08-10 DIAGNOSIS — Z12.73 ENCOUNTER FOR SCREENING FOR MALIGNANT NEOPLASM OF OVARY: ICD-10-CM

## 2023-08-28 ENCOUNTER — OUTPATIENT (OUTPATIENT)
Dept: OUTPATIENT SERVICES | Facility: HOSPITAL | Age: 63
LOS: 1 days | End: 2023-08-28
Payer: MEDICAID

## 2023-08-28 DIAGNOSIS — L98.8 OTHER SPECIFIED DISORDERS OF THE SKIN AND SUBCUTANEOUS TISSUE: ICD-10-CM

## 2023-08-28 DIAGNOSIS — M21.70 UNEQUAL LIMB LENGTH (ACQUIRED), UNSPECIFIED SITE: ICD-10-CM

## 2023-08-28 DIAGNOSIS — Z98.890 OTHER SPECIFIED POSTPROCEDURAL STATES: Chronic | ICD-10-CM

## 2023-08-28 PROCEDURE — 77073 BONE LENGTH STUDIES: CPT | Mod: 26

## 2023-08-28 PROCEDURE — 77073 BONE LENGTH STUDIES: CPT

## 2023-08-29 DIAGNOSIS — M21.70 UNEQUAL LIMB LENGTH (ACQUIRED), UNSPECIFIED SITE: ICD-10-CM

## 2023-10-12 ENCOUNTER — APPOINTMENT (OUTPATIENT)
Dept: ORTHOPEDIC SURGERY | Facility: CLINIC | Age: 63
End: 2023-10-12
Payer: MEDICAID

## 2023-10-12 DIAGNOSIS — M17.12 UNILATERAL PRIMARY OSTEOARTHRITIS, LEFT KNEE: ICD-10-CM

## 2023-10-12 PROCEDURE — 73562 X-RAY EXAM OF KNEE 3: CPT | Mod: LT

## 2023-10-12 PROCEDURE — 20610 DRAIN/INJ JOINT/BURSA W/O US: CPT | Mod: LT

## 2023-10-12 PROCEDURE — 99213 OFFICE O/P EST LOW 20 MIN: CPT | Mod: 25

## 2024-01-10 ENCOUNTER — OUTPATIENT (OUTPATIENT)
Dept: OUTPATIENT SERVICES | Facility: HOSPITAL | Age: 64
LOS: 1 days | End: 2024-01-10
Payer: MEDICAID

## 2024-01-10 DIAGNOSIS — Z98.890 OTHER SPECIFIED POSTPROCEDURAL STATES: Chronic | ICD-10-CM

## 2024-01-10 DIAGNOSIS — R92.8 OTHER ABNORMAL AND INCONCLUSIVE FINDINGS ON DIAGNOSTIC IMAGING OF BREAST: ICD-10-CM

## 2024-01-10 PROCEDURE — 77065 DX MAMMO INCL CAD UNI: CPT | Mod: LT

## 2024-01-10 PROCEDURE — G0279: CPT

## 2024-01-10 PROCEDURE — 77061 BREAST TOMOSYNTHESIS UNI: CPT | Mod: 26

## 2024-01-10 PROCEDURE — 77065 DX MAMMO INCL CAD UNI: CPT | Mod: 26,LT

## 2024-01-11 DIAGNOSIS — R92.8 OTHER ABNORMAL AND INCONCLUSIVE FINDINGS ON DIAGNOSTIC IMAGING OF BREAST: ICD-10-CM

## 2024-01-23 ENCOUNTER — OUTPATIENT (OUTPATIENT)
Dept: OUTPATIENT SERVICES | Facility: HOSPITAL | Age: 64
LOS: 1 days | End: 2024-01-23
Payer: MEDICAID

## 2024-01-23 DIAGNOSIS — Z98.890 OTHER SPECIFIED POSTPROCEDURAL STATES: Chronic | ICD-10-CM

## 2024-01-23 DIAGNOSIS — Z00.8 ENCOUNTER FOR OTHER GENERAL EXAMINATION: ICD-10-CM

## 2024-01-23 DIAGNOSIS — Z13.820 ENCOUNTER FOR SCREENING FOR OSTEOPOROSIS: ICD-10-CM

## 2024-01-23 PROCEDURE — 77080 DXA BONE DENSITY AXIAL: CPT

## 2024-01-23 PROCEDURE — 77080 DXA BONE DENSITY AXIAL: CPT | Mod: 26

## 2024-01-24 DIAGNOSIS — Z13.820 ENCOUNTER FOR SCREENING FOR OSTEOPOROSIS: ICD-10-CM

## 2024-03-14 ENCOUNTER — OUTPATIENT (OUTPATIENT)
Dept: OUTPATIENT SERVICES | Facility: HOSPITAL | Age: 64
LOS: 1 days | End: 2024-03-14
Payer: MEDICAID

## 2024-03-14 VITALS
HEIGHT: 67 IN | TEMPERATURE: 98 F | DIASTOLIC BLOOD PRESSURE: 73 MMHG | WEIGHT: 225.09 LBS | RESPIRATION RATE: 16 BRPM | HEART RATE: 63 BPM | OXYGEN SATURATION: 98 % | SYSTOLIC BLOOD PRESSURE: 137 MMHG

## 2024-03-14 DIAGNOSIS — M25.571 PAIN IN RIGHT ANKLE AND JOINTS OF RIGHT FOOT: ICD-10-CM

## 2024-03-14 DIAGNOSIS — Z01.818 ENCOUNTER FOR OTHER PREPROCEDURAL EXAMINATION: ICD-10-CM

## 2024-03-14 DIAGNOSIS — Z98.890 OTHER SPECIFIED POSTPROCEDURAL STATES: Chronic | ICD-10-CM

## 2024-03-14 DIAGNOSIS — M24.574 CONTRACTURE, RIGHT FOOT: ICD-10-CM

## 2024-03-14 LAB
ALBUMIN SERPL ELPH-MCNC: 4.4 G/DL — SIGNIFICANT CHANGE UP (ref 3.5–5.2)
ALP SERPL-CCNC: 108 U/L — SIGNIFICANT CHANGE UP (ref 30–115)
ALT FLD-CCNC: 19 U/L — SIGNIFICANT CHANGE UP (ref 0–41)
ANION GAP SERPL CALC-SCNC: 12 MMOL/L — SIGNIFICANT CHANGE UP (ref 7–14)
AST SERPL-CCNC: 21 U/L — SIGNIFICANT CHANGE UP (ref 0–41)
BASOPHILS # BLD AUTO: 0.06 K/UL — SIGNIFICANT CHANGE UP (ref 0–0.2)
BASOPHILS NFR BLD AUTO: 0.8 % — SIGNIFICANT CHANGE UP (ref 0–1)
BILIRUB SERPL-MCNC: 0.2 MG/DL — SIGNIFICANT CHANGE UP (ref 0.2–1.2)
BUN SERPL-MCNC: 12 MG/DL — SIGNIFICANT CHANGE UP (ref 10–20)
CALCIUM SERPL-MCNC: 8.9 MG/DL — SIGNIFICANT CHANGE UP (ref 8.4–10.5)
CHLORIDE SERPL-SCNC: 105 MMOL/L — SIGNIFICANT CHANGE UP (ref 98–110)
CO2 SERPL-SCNC: 22 MMOL/L — SIGNIFICANT CHANGE UP (ref 17–32)
CREAT SERPL-MCNC: 0.5 MG/DL — LOW (ref 0.7–1.5)
EGFR: 106 ML/MIN/1.73M2 — SIGNIFICANT CHANGE UP
EOSINOPHIL # BLD AUTO: 0.24 K/UL — SIGNIFICANT CHANGE UP (ref 0–0.7)
EOSINOPHIL NFR BLD AUTO: 3.3 % — SIGNIFICANT CHANGE UP (ref 0–8)
GLUCOSE SERPL-MCNC: 96 MG/DL — SIGNIFICANT CHANGE UP (ref 70–99)
HCT VFR BLD CALC: 42.3 % — SIGNIFICANT CHANGE UP (ref 37–47)
HGB BLD-MCNC: 13.8 G/DL — SIGNIFICANT CHANGE UP (ref 12–16)
IMM GRANULOCYTES NFR BLD AUTO: 0.3 % — SIGNIFICANT CHANGE UP (ref 0.1–0.3)
LYMPHOCYTES # BLD AUTO: 1.88 K/UL — SIGNIFICANT CHANGE UP (ref 1.2–3.4)
LYMPHOCYTES # BLD AUTO: 26.2 % — SIGNIFICANT CHANGE UP (ref 20.5–51.1)
MCHC RBC-ENTMCNC: 26.7 PG — LOW (ref 27–31)
MCHC RBC-ENTMCNC: 32.6 G/DL — SIGNIFICANT CHANGE UP (ref 32–37)
MCV RBC AUTO: 82 FL — SIGNIFICANT CHANGE UP (ref 81–99)
MONOCYTES # BLD AUTO: 0.87 K/UL — HIGH (ref 0.1–0.6)
MONOCYTES NFR BLD AUTO: 12.1 % — HIGH (ref 1.7–9.3)
NEUTROPHILS # BLD AUTO: 4.11 K/UL — SIGNIFICANT CHANGE UP (ref 1.4–6.5)
NEUTROPHILS NFR BLD AUTO: 57.3 % — SIGNIFICANT CHANGE UP (ref 42.2–75.2)
NRBC # BLD: 0 /100 WBCS — SIGNIFICANT CHANGE UP (ref 0–0)
PLATELET # BLD AUTO: 270 K/UL — SIGNIFICANT CHANGE UP (ref 130–400)
PMV BLD: 11 FL — HIGH (ref 7.4–10.4)
POTASSIUM SERPL-MCNC: 4.6 MMOL/L — SIGNIFICANT CHANGE UP (ref 3.5–5)
POTASSIUM SERPL-SCNC: 4.6 MMOL/L — SIGNIFICANT CHANGE UP (ref 3.5–5)
PROT SERPL-MCNC: 7.1 G/DL — SIGNIFICANT CHANGE UP (ref 6–8)
RBC # BLD: 5.16 M/UL — SIGNIFICANT CHANGE UP (ref 4.2–5.4)
RBC # FLD: 15.1 % — HIGH (ref 11.5–14.5)
SODIUM SERPL-SCNC: 139 MMOL/L — SIGNIFICANT CHANGE UP (ref 135–146)
WBC # BLD: 7.18 K/UL — SIGNIFICANT CHANGE UP (ref 4.8–10.8)
WBC # FLD AUTO: 7.18 K/UL — SIGNIFICANT CHANGE UP (ref 4.8–10.8)

## 2024-03-14 PROCEDURE — 36415 COLL VENOUS BLD VENIPUNCTURE: CPT

## 2024-03-14 PROCEDURE — 80053 COMPREHEN METABOLIC PANEL: CPT

## 2024-03-14 PROCEDURE — 93005 ELECTROCARDIOGRAM TRACING: CPT

## 2024-03-14 PROCEDURE — 93010 ELECTROCARDIOGRAM REPORT: CPT

## 2024-03-14 PROCEDURE — 99214 OFFICE O/P EST MOD 30 MIN: CPT | Mod: 25

## 2024-03-14 PROCEDURE — 85025 COMPLETE CBC W/AUTO DIFF WBC: CPT

## 2024-03-14 RX ORDER — LEVOTHYROXINE SODIUM 125 MCG
1 TABLET ORAL
Qty: 0 | Refills: 0 | DISCHARGE

## 2024-03-14 NOTE — H&P PST ADULT - HISTORY OF PRESENT ILLNESS
Patient is a 62 yo female with PMH od HTN, HLD, Hypothyroid who presents to Pretesting for the above surgery due to pain and limited ROM. Patient also has clauses on right foot and c/o sharp pain scale 7/10 with ampulation.    Patient denies any cp, sob, palpitations, fever, cough, URI, abdominal pains, N/V, UTI, Rashes or open wounds.  As per patient exercise tolerance of 1-2 fos walks with out sob  Patient denies any s/s covid 19 and reports no contact with known positive people. Patient instructed to continue to self monitor and report any concerns to MD. Pt will continue to practice self isolation and  exposure control measures pre op  Anesthesia Alert  NO--Difficult Airway  NO--History of neck surgery or radiation  NO--Limited ROM of neck  NO--History of Malignant hyperthermia  NO--Personal or family history of Pseudocholinesterase deficiency  NO--Prior Anesthesia Complication  NO--Latex Allergy  NO--Loose teeth  NO--History of Rheumatoid Arthritis  NO--SLICK  NO--Risk of bleedings   [employer] Activity Status Index (DASI) from [employer]  on 3/14/2024  ** All calculations should be rechecked by clinician prior to use **    RESULT SUMMARY:  34.7 points  The higher the score (maximum 58.2), the higher the functional status.  7.01 METs  INPUTS:  Take care of self —> 2.75 = Yes  Walk indoors —> 1.75 = Yes  Walk 1&ndash;2 blocks on level ground —> 2.75 = Yes  Climb a flight of stairs or walk up a hill —> 5.5 = Yes  Run a short distance —> 0 = No  Do light work around the house —> 2.7 = Yes  Do moderate work around the house —> 3.5 = Yes  Do heavy work around the house —> 0 = No  Do yardwork —> 4.5 = Yes  Have sexual relations —> 5.25 = Yes  Participate in moderate recreational activities —> 6 = Yes  Participate in strenuous sports —> 0 = No  Revised Cardiac Risk Index for Pre-Operative Risk from [employer]  on 3/14/2024  ** All calculations should be rechecked by clinician prior to use **    RESULT SUMMARY:  0 points  Class I Risk  3.9 %  30-day risk of death, MI, or cardiac arrest  From [employer] 2017. These numbers are higher than those from the original study (Harjinder 1999). See Evidence for details.  INPUTS:  Elevated-risk surgery —> 0 = No  History of ischemic heart disease —> 0 = No  History of congestive heart failure —> 0 = No  History of cerebrovascular disease —> 0 = No  Pre-operative treatment with insulin —> 0 = No  Pre-operative creatinine >2 mg/dL / 176.8 µmol/L —> 0 = No   Patient is a 62 yo female with PMH old HTN, HLD, Hypothyroid, B/l foot surgeries who presents to Pretesting for the above surgery due to pain and limited ROM. Patient also has clauses on right foot and c/o sharp pain scale 7/10 with ambulation.    Patient denies any cp, sob, palpitations, fever, cough, URI, abdominal pains, N/V, UTI, Rashes or open wounds.  As per patient exercise tolerance of 1-2 fos walks with out sob  Patient denies any s/s covid 19 and reports no contact with known positive people. Patient instructed to continue to self monitor and report any concerns to MD. Pt will continue to practice self isolation and  exposure control measures pre op  Anesthesia Alert  NO--Difficult Airway  NO--History of neck surgery or radiation  NO--Limited ROM of neck  NO--History of Malignant hyperthermia  NO--Personal or family history of Pseudocholinesterase deficiency  NO--Prior Anesthesia Complication  NO--Latex Allergy  NO--Loose teeth  NO--History of Rheumatoid Arthritis  NO--SLICK  NO--Risk of bleedings   [employer] Activity Status Index (DASI) from [email or web address]  on 3/14/2024  ** All calculations should be rechecked by clinician prior to use **  RESULT SUMMARY:  34.7 points  The higher the score (maximum 58.2), the higher the functional status.  7.01 METs  INPUTS:  Take care of self —> 2.75 = Yes  Walk indoors —> 1.75 = Yes  Walk 1&ndash;2 blocks on level ground —> 2.75 = Yes  Climb a flight of stairs or walk up a hill —> 5.5 = Yes  Run a short distance —> 0 = No  Do light work around the house —> 2.7 = Yes  Do moderate work around the house —> 3.5 = Yes  Do heavy work around the house —> 0 = No  Do yard work —> 4.5 = Yes  Have sexual relations —> 5.25 = Yes  Participate in moderate recreational activities —> 6 = Yes  Participate in strenuous sports —> 0 = No  Revised Cardiac Risk Index for Pre-Operative Risk from [email or web address]  on 3/14/2024  ** All calculations should be rechecked by clinician prior to use **  RESULT SUMMARY:  0 points  Class I Risk  3.9 %  30-day risk of death, MI, or cardiac arrest  From [employer] 2017. These numbers are higher than those from the original study (Harjinder 1999). See Evidence for details.  INPUTS:  Elevated-risk surgery —> 0 = No  History of ischemic heart disease —> 0 = No  History of congestive heart failure —> 0 = No  History of cerebrovascular disease —> 0 = No  Pre-operative treatment with insulin —> 0 = No  Pre-operative creatinine >2 mg/dL / 176.8 µmol/L —> 0 = No

## 2024-03-14 NOTE — H&P PST ADULT - REASON FOR ADMISSION
Case Type: OP Non-block TimeSuite: OR SouthProceduralist: Hernandez Malone  Confirmed Surgery DateTime: 03- - 0:00PAST DateTime: 03- - 7:15Procedure: RIGHT THIRD AND FORTH METATARSAL OSTEOTOMY WITH HARDWARE FIXATION AND REMOVAL OF OLD HARDWARE  ERP?: NoLaterality: RightLength of Procedure: 60 Minutes  Anesthesia Type: General Case Type: OP   Suite: [facility]  Proceduralist: Hernandez Malone  Confirmed Surgery Date Time: 03-   PAST Date Time: 03- - 7:15  Procedure: RIGHT THIRD AND FORTH METATARSAL OSTEOTOMY WITH HARDWARE FIXATION AND REMOVAL OF OLD HARDWARE  Laterality: Right  Length of Procedure: 60 Minutes  Anesthesia Type: General

## 2024-03-14 NOTE — H&P PST ADULT - NSICDXPASTMEDICALHX_GEN_ALL_CORE_FT
PAST MEDICAL HISTORY:  Glaucoma     H/O hammer toe correction     HTN (hypertension)     Hypercholesteremia     Hypothyroidism

## 2024-03-15 DIAGNOSIS — Z01.818 ENCOUNTER FOR OTHER PREPROCEDURAL EXAMINATION: ICD-10-CM

## 2024-03-15 DIAGNOSIS — M24.574 CONTRACTURE, RIGHT FOOT: ICD-10-CM

## 2024-03-28 ENCOUNTER — RESULT REVIEW (OUTPATIENT)
Age: 64
End: 2024-03-28

## 2024-03-28 ENCOUNTER — TRANSCRIPTION ENCOUNTER (OUTPATIENT)
Age: 64
End: 2024-03-28

## 2024-03-28 ENCOUNTER — OUTPATIENT (OUTPATIENT)
Dept: OUTPATIENT SERVICES | Facility: HOSPITAL | Age: 64
LOS: 1 days | Discharge: HOME | End: 2024-03-28
Payer: MEDICAID

## 2024-03-28 VITALS
WEIGHT: 225.09 LBS | DIASTOLIC BLOOD PRESSURE: 67 MMHG | TEMPERATURE: 96 F | HEART RATE: 59 BPM | SYSTOLIC BLOOD PRESSURE: 119 MMHG | OXYGEN SATURATION: 95 % | RESPIRATION RATE: 17 BRPM | HEIGHT: 67 IN

## 2024-03-28 VITALS — RESPIRATION RATE: 18 BRPM | DIASTOLIC BLOOD PRESSURE: 75 MMHG | HEART RATE: 75 BPM | SYSTOLIC BLOOD PRESSURE: 129 MMHG

## 2024-03-28 DIAGNOSIS — M24.574 CONTRACTURE, RIGHT FOOT: ICD-10-CM

## 2024-03-28 DIAGNOSIS — Z98.890 OTHER SPECIFIED POSTPROCEDURAL STATES: Chronic | ICD-10-CM

## 2024-03-28 DIAGNOSIS — M25.571 PAIN IN RIGHT ANKLE AND JOINTS OF RIGHT FOOT: ICD-10-CM

## 2024-03-28 PROCEDURE — 73630 X-RAY EXAM OF FOOT: CPT | Mod: 26,RT

## 2024-03-28 PROCEDURE — 73630 X-RAY EXAM OF FOOT: CPT | Mod: RT

## 2024-03-28 PROCEDURE — 88311 DECALCIFY TISSUE: CPT | Mod: 26

## 2024-03-28 PROCEDURE — C1713: CPT

## 2024-03-28 PROCEDURE — 88311 DECALCIFY TISSUE: CPT

## 2024-03-28 PROCEDURE — 88304 TISSUE EXAM BY PATHOLOGIST: CPT

## 2024-03-28 PROCEDURE — 88300 SURGICAL PATH GROSS: CPT | Mod: 26,59

## 2024-03-28 PROCEDURE — 88304 TISSUE EXAM BY PATHOLOGIST: CPT | Mod: 26

## 2024-03-28 PROCEDURE — 88300 SURGICAL PATH GROSS: CPT

## 2024-03-28 RX ORDER — ONDANSETRON 8 MG/1
4 TABLET, FILM COATED ORAL ONCE
Refills: 0 | Status: DISCONTINUED | OUTPATIENT
Start: 2024-03-28 | End: 2024-03-28

## 2024-03-28 RX ORDER — HYDROMORPHONE HYDROCHLORIDE 2 MG/ML
0.5 INJECTION INTRAMUSCULAR; INTRAVENOUS; SUBCUTANEOUS
Refills: 0 | Status: DISCONTINUED | OUTPATIENT
Start: 2024-03-28 | End: 2024-03-28

## 2024-03-28 RX ORDER — SODIUM CHLORIDE 9 MG/ML
1000 INJECTION, SOLUTION INTRAVENOUS
Refills: 0 | Status: DISCONTINUED | OUTPATIENT
Start: 2024-03-28 | End: 2024-03-28

## 2024-03-28 RX ORDER — CHOLECALCIFEROL (VITAMIN D3) 125 MCG
1 CAPSULE ORAL
Refills: 0 | DISCHARGE

## 2024-03-28 RX ORDER — LATANOPROST 0.05 MG/ML
1 SOLUTION/ DROPS OPHTHALMIC; TOPICAL
Qty: 0 | Refills: 0 | DISCHARGE

## 2024-03-28 RX ORDER — LEVOTHYROXINE SODIUM 125 MCG
1 TABLET ORAL
Refills: 0 | DISCHARGE

## 2024-03-28 NOTE — ASU PREOP CHECKLIST - COMMENTS
Cardiothoracic Surgery  OUT-PATIENT SERVICE   [facility]    CONSULTATION / HISTORY AND PHYSICAL EXAMINATION            Date:   12/20/2023  Patient name:  Cele Camarena  MRN:   [id number]  YOB: 1965  PCP:    Leena Murrieta APRN - CNP    Physician Requesting Consult: Deshawn Carreon MD    Reason for Consult:  MVAD    Chief Complaint:     No chief complaint on file.      History Obtained From:     patient    History of Present Illness:     Ms Camarena is a 58y.o. female who presents to [facility] for possible CABG surgery. She discovered her heart disease during work up for knee replacement surgery. She has a history of OA, HLD, DMII, depression, CKDIII, PVD, and fibromyalgia. She denies CP, SOB, swelling in lower extremities, dizziness, syncope. She denies history of CVA, MI, AF, liver disease, or prior cancers.  Echo shows non-valvular heart disease with EF 40-45%. LHC shows significant stenosis in LAD, Rca, and Lcx without LM disease. She is not currently on OAC. She denies smoking, etoh use or illicit drug use.     Past Medical History:     Past Medical History:   Diagnosis Date    Allergic rhinitis     Arthritis     CAD (coronary artery disease)     multivessel. Dr. Grier [facility] [place] last visit 10/2023    CKD (chronic kidney disease), stage III (HCC)     Dr. Gann [facility] last appt 10/10/2023    Depression     Fibromyalgia     Gout     Dr. Cyndie Cabrales [facility]    History of fractured vertebra 2019    L2 L3 L4    Hyperlipidemia     Neuropathy     OA (osteoarthritis)     Peripheral vascular disease (HCC)     Sacroiliac joint dysfunction     Tremor     Type II or unspecified type diabetes mellitus without mention of complication, not stated as uncontrolled     PCP    Under care of team     Dr. Kings Lugo last appt 9/2023    Wellness examination     Leena Murrieta CNP PCP last appt 10/2023        Past Surgical History:  like to proceed ASAP.    We will plan for surgery on Thursday November 30, 2023 with Dr Ubaldo Silver studies reviewed, imaging is adequate for surgery  We will plan for radial harvest   Hold actos and metformin     Demetris Peterson, AQUILES, CNP    12/20/2023  11:04 AM sip water with meds

## 2024-03-28 NOTE — BRIEF OPERATIVE NOTE - PRIMARY SURGEON
Internal Medicine  Progress Note      Subjective  Afebrile  Resumed feeding , low rate of d5  Blood glucose  controlled        Review of Systems   unable  Objective    Current Meds  Current Facility-Administered Medications   Medication Dose Route Frequency Provider Last Rate Last Admin   • acetaminophen (TYLENOL) 160 MG/5ML solution 650 mg  650 mg Per G Tube Q4H PRN Faustino Goff MD       • albuterol (ACCUNEB) nebulizer 1.25 mg  1.25 mg Nebulization Q6H PRN Faustino Goff MD       • amLODIPine (NORVASC) tablet 5 mg  5 mg Per G Tube Daily Faustino Goff MD       • aspirin chewable 81 mg  81 mg Per G Tube Daily Faustino Goff MD   81 mg at 09/17/21 0917   • hydrALAZINE (APRESOLINE) tablet 100 mg  100 mg Per G Tube Q8H PRN Faustino Goff MD       • HYDROcodone-acetaminophen (NORCO) 5-325 MG per tablet 1 tablet  1 tablet Per G Tube Q6H PRN Faustino Goff MD       • insulin lispro (ADMELOG, HumaLOG) sliding scale injection 1-9 Units  1-9 Units Subcutaneous Q6H Faustino Goff MD   2 Units at 09/17/21 1258   • midodrine (PROAMATINE) tablet 10 mg  10 mg Per G Tube 3 times per day Faustino Goff MD   10 mg at 09/17/21 1257   • pantoprazole (PROTONIX) EC tablet 40 mg  40 mg Oral QAM AC Faustino Goff MD   40 mg at 09/17/21 0818   • thiamine (VITAMIN B1) tablet 50 mg  50 mg PEG Tube Daily Faustino Goff MD   50 mg at 09/17/21 0917   • dextrose 50 % injection 25 g  25 g Intravenous PRN Faustino Goff MD       • dextrose 50 % injection 12.5 g  12.5 g Intravenous PRN Faustino Goff MD       • glucagon (GLUCAGEN) injection 1 mg  1 mg Intramuscular PRN Faustino Goff MD       • dextrose (GLUTOSE) 40 % gel 15 g  15 g Oral PRN Faustino Goff MD       • dextrose (GLUTOSE) 40 % gel 30 g  30 g Oral PRN Faustino Goff MD       • levETIRAcetam ORAL (KepPRA) 100 MG/ML oral solution 500 mg  500 mg Per G Tube 2 times per day Faustino Goff MD       • vancomycin (VANCOCIN) 500 mg in sodium chloride 0.9 % 100 mL IVPB  500 mg Intravenous Once per day on Mon  Wed Fri Faustino Goff MD       • dextrose 5 % / sodium chloride 0.9% infusion   Intravenous Continuous Faustino Goff MD 50 mL/hr at 09/17/21 0217 Rate Change at 09/17/21 0217   • sodium phosphate 45 mmol in sodium chloride 0.9 % 500 mL IVPB  45 mmol Intravenous Once Sherie Weiss DO       • ipratropium-albuterol (DUONEB) 0.5-2.5 (3) MG/3ML nebulizer solution 3 mL  3 mL Nebulization Q6H Resp Wai Acosta MD   3 mL at 09/17/21 1303   • ipratropium-albuterol (DUONEB) 0.5-2.5 (3) MG/3ML nebulizer solution 3 mL  3 mL Nebulization Q4H Resp PRN Wai Acosta MD       • epoetin norma-epbx (RETACRIT) 71241 UNIT/ML injection 10,000 Units  10,000 Units Intravenous Once per day on Mon Wed Fri Alpesh Kwan MD   10,000 Units at 09/17/21 1628   • midodrine (PROAMATINE) tablet 10 mg  10 mg Oral PRN Alpesh Kwan MD   10 mg at 09/17/21 1529   • sodium citrate anticoagulant 4 % flush 3 mL  3 mL Intracatheter PRN Alpesh Kwan MD       • sodium chloride (NORMAL SALINE) 0.9 % bolus 100-200 mL  100-200 mL Intravenous PRN Alpesh Kwan MD       • VANCOMYCIN - PHARMACIST MONITORED Misc   Does not apply See Admin Instructions Gus Childs MD       • cefepime (MAXIPIME) 1,000 mg in sodium chloride 0.9 % 100 mL IVPB  1,000 mg Intravenous Q24H Gus Childs  mL/hr at 09/16/21 2258 1,000 mg at 09/16/21 2258   • heparin (porcine) injection 5,000 Units  5,000 Units Subcutaneous 3 times per day Faustino Goff MD   5,000 Units at 09/17/21 1257        I/O's    Intake/Output Summary (Last 24 hours) at 9/17/2021 1906  Last data filed at 9/17/2021 1900  Gross per 24 hour   Intake 460 ml   Output 175 ml   Net 285 ml       Last Recorded Vitals    Vital Last Value 72 Hour Range   Temperature 96.7 °F (35.9 °C) (09/17/21 1526) Temp  Min: 96.7 °F (35.9 °C)  Max: 99.3 °F (37.4 °C)   Pulse 87 (09/17/21 1830) Pulse  Min: 83  Max: 119   Respiratory 18 (09/17/21 1830) Resp  Min: 13  Max: 24   Non-Invasive  Blood Pressure 117/66  (09/17/21 1830) BP  Min: 99/57  Max: 141/75   Pulse Oximetry 99 % (09/17/21 1526) SpO2  Min: 98 %  Max: 100 %   Arterial   Blood Pressure   No data recorded          Physical Exam   Constitutional:   Comments: Intubated   Eyes:   General:   Right eye: No discharge.   Left eye: No discharge.   Cardiovascular:   Rate and Rhythm: Normal rate and regular rhythm.   Heart sounds: No murmur heard.     Pulmonary:   Comments: Coarse upper airway breath sounds throughout  Abdominal:   General: There is no distension.   Palpations: There is no mass.   Tenderness: There is no abdominal tenderness. There is no rebound.   Comments: Left lower quadrant mucous fistula with stool drainage   Musculoskeletal:   Cervical back: No rigidity.   Right lower leg: No edema.   Left lower leg: No edema.   Lymphadenopathy:   Cervical: No cervical adenopathy.   Skin:   General: Skin is warm.   sacral decu ulcer       Labs     Recent Labs   Lab 09/17/21  0911 09/15/21  2355   WBC 4.8 17.6*   RBC 2.76* 3.34*   HGB 7.5* 9.4*   HCT 24.8* 30.2*    296      Recent Labs   Lab 09/17/21  0911 09/15/21  2355   SODIUM 131* 129*   POTASSIUM 4.0 3.4   CHLORIDE 98 94*   CO2 25 26   BUN 77* 53*   CREATININE 5.97* 4.27*   CALCIUM 9.5 10.2   ALBUMIN 2.5* 2.9*   BILIRUBIN 0.5 0.4   ALKPT 215* 315*   GPT 23 32   AST 43* 42*   GLUCOSE 151* 77        Microbiology Results  (Last 10 results in the past 7 days)    Specimen   Gram Smear   Culture Result   Status         09/17/21  1302          Adequate quality specimen. Acute inflammation with moderate/many neutrophils. Mixed bacteria with no predominant type.[P]                [P] - Preliminary Result             Imaging  CT ABDOMEN PELVIS WO CONTRAST   Final Result      Increasing basilar atelectasis or infiltrates, otherwise no significant   interval change.      Chronic findings as detailed above.      Electronically Signed by: RICK VALLE M.D.    Signed on: 9/16/2021 5:26 AM          XR CHEST PA OR AP  Hernandez Malone (Attending) 1 VIEW   Final Result      Low lung volumes with mild basilar atelectasis or infiltrates.      Electronically Signed by: RICK VALLE M.D.    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   -Monitor blood glucose   -HD   -Wound care consult          DVT Prophylaxis  aspirin - 81 MG, 81 MG  heparin (porcine) - 5000 UNIT/ML, 5000 UNIT/ML  sodium citrate anticoagulant - 4 %  sodium citrate anticoagulant Solution - 4 %     Code Status  Code Status Information     Code Status    Prior           PCP  Faustino Goff MD    The  time of the note does not reflect exact time patient seen  Discussed with the family.    Faustino Goff MD  9/17/2021 7:06 PM         <<----- Click to Select Surgeon

## 2024-03-28 NOTE — BRIEF OPERATIVE NOTE - NSICDXBRIEFPROCEDURE_GEN_ALL_CORE_FT
PROCEDURES:  Removal of hardware from metatarsal bone of right foot 28-Mar-2024 11:22:54 Screw removal from 3rd and 4th metatarsals Jennifer Patino  Osteotomy of third metatarsal 28-Mar-2024 11:23:35 Right foot, fixation with metatarsa plate Jennifer Patino  Osteotomy, metatarsal, fourth 28-Mar-2024 11:23:43 Right foot fixation with ilsa metatarsal plate Jennifer Patino

## 2024-03-28 NOTE — BRIEF OPERATIVE NOTE - NSICDXBRIEFPREOP_GEN_ALL_CORE_FT
PRE-OP DIAGNOSIS:  Acquired mallet toe, right 28-Mar-2024 11:22:14  Jennifer Patino  Cavus foot, acquired 28-Mar-2024 11:22:25  Jennifer Patino

## 2024-03-28 NOTE — PRE-ANESTHESIA EVALUATION ADULT - NSANTHTIREDRD_ENT_A_CORE
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  Has been at [facility] since 10/31 with hematuria received 15 units PRBCs, gone to OR and had 15 hyperbaric oxygen treatments.  Per oncology pt has established diagnosis of low grade noninvasive bladder cancer from [facility] 10/18/19 with Dr. García Best.  Transferred for persistent hematuria, s/p trial of Alum x2, continues on CBI, for OR today No

## 2024-03-28 NOTE — ASU DISCHARGE PLAN (ADULT/PEDIATRIC) - CARE PROVIDER_API CALL
Hernandez Malone  Podiatric Medicine  [address] [place] [id number]  Phone: [phone]  Fax: [phone]  Established Patient  Follow Up Time:

## 2024-03-28 NOTE — ASU PATIENT PROFILE, ADULT - NSICDXPASTSURGICALHX_GEN_ALL_CORE_FT
PAST SURGICAL HISTORY:  H/O foot surgery RIGHT FOOT 2022 and left    History of surgery tonsillectomy

## 2024-03-28 NOTE — ASU PATIENT PROFILE, ADULT - FALL HARM RISK - HARM RISK INTERVENTIONS
Assistance OOB with selected safe patient handling equipment/Communicate Risk of Fall with Harm to all staff/Discuss with provider need for PT consult/Monitor gait and stability/Provide patient with walking aids - walker, cane, crutches/Reinforce activity limits and safety measures with patient and family/Review medications for side effects contributing to fall risk/Sit up slowly, dangle for a short time, stand at bedside before walking/Tailored Fall Risk Interventions/Toileting schedule using arm’s reach rule for commode and bathroom/Visual Cue: Yellow wristband and red socks/Bed in lowest position, wheels locked, appropriate side rails in place/Call bell, personal items and telephone in reach/Instruct patient to call for assistance before getting out of bed or chair/Non-slip footwear when patient is out of bed/[place] to call system/Physically safe environment - no spills, clutter or unnecessary equipment/Purposeful Proactive Rounding/Room/bathroom lighting operational, light cord in reach

## 2024-04-01 LAB — SURGICAL PATHOLOGY STUDY: SIGNIFICANT CHANGE UP

## 2024-04-03 DIAGNOSIS — Z87.891 PERSONAL HISTORY OF NICOTINE DEPENDENCE: ICD-10-CM

## 2024-04-03 DIAGNOSIS — E78.00 PURE HYPERCHOLESTEROLEMIA, UNSPECIFIED: ICD-10-CM

## 2024-04-03 DIAGNOSIS — M20.41 OTHER HAMMER TOE(S) (ACQUIRED), RIGHT FOOT: ICD-10-CM

## 2024-04-03 DIAGNOSIS — M21.6X1 OTHER ACQUIRED DEFORMITIES OF RIGHT FOOT: ICD-10-CM

## 2024-04-03 DIAGNOSIS — E03.9 HYPOTHYROIDISM, UNSPECIFIED: ICD-10-CM

## 2024-04-03 DIAGNOSIS — Z88.0 ALLERGY STATUS TO PENICILLIN: ICD-10-CM

## 2024-04-03 DIAGNOSIS — H40.9 UNSPECIFIED GLAUCOMA: ICD-10-CM

## 2024-04-03 DIAGNOSIS — I10 ESSENTIAL (PRIMARY) HYPERTENSION: ICD-10-CM

## 2024-05-02 PROBLEM — Z98.890 OTHER SPECIFIED POSTPROCEDURAL STATES: Chronic | Status: ACTIVE | Noted: 2024-03-14

## 2024-05-02 PROBLEM — E03.9 HYPOTHYROIDISM, UNSPECIFIED: Chronic | Status: ACTIVE | Noted: 2024-03-14

## 2024-05-06 ENCOUNTER — OUTPATIENT (OUTPATIENT)
Dept: OUTPATIENT SERVICES | Facility: HOSPITAL | Age: 64
LOS: 1 days | End: 2024-05-06
Payer: MEDICAID

## 2024-05-06 DIAGNOSIS — Z98.890 OTHER SPECIFIED POSTPROCEDURAL STATES: Chronic | ICD-10-CM

## 2024-05-06 DIAGNOSIS — Z12.31 ENCOUNTER FOR SCREENING MAMMOGRAM FOR MALIGNANT NEOPLASM OF BREAST: ICD-10-CM

## 2024-05-06 DIAGNOSIS — N63.10 UNSPECIFIED LUMP IN THE RIGHT BREAST, UNSPECIFIED QUADRANT: ICD-10-CM

## 2024-05-06 PROCEDURE — 76642 ULTRASOUND BREAST LIMITED: CPT | Mod: 26,RT

## 2024-05-06 PROCEDURE — G0279: CPT

## 2024-05-06 PROCEDURE — 77065 DX MAMMO INCL CAD UNI: CPT | Mod: 26,RT

## 2024-05-06 PROCEDURE — 77065 DX MAMMO INCL CAD UNI: CPT | Mod: RT

## 2024-05-06 PROCEDURE — 76642 ULTRASOUND BREAST LIMITED: CPT | Mod: RT

## 2024-05-06 PROCEDURE — 77061 BREAST TOMOSYNTHESIS UNI: CPT | Mod: 26

## 2024-05-07 DIAGNOSIS — N63.10 UNSPECIFIED LUMP IN THE RIGHT BREAST, UNSPECIFIED QUADRANT: ICD-10-CM

## 2024-07-11 ENCOUNTER — OUTPATIENT (OUTPATIENT)
Dept: OUTPATIENT SERVICES | Facility: HOSPITAL | Age: 64
LOS: 1 days | End: 2024-07-11
Payer: MEDICAID

## 2024-07-11 DIAGNOSIS — Z98.890 OTHER SPECIFIED POSTPROCEDURAL STATES: Chronic | ICD-10-CM

## 2024-07-11 DIAGNOSIS — Z12.31 ENCOUNTER FOR SCREENING MAMMOGRAM FOR MALIGNANT NEOPLASM OF BREAST: ICD-10-CM

## 2024-07-11 PROCEDURE — 77067 SCR MAMMO BI INCL CAD: CPT | Mod: 26

## 2024-07-11 PROCEDURE — 77067 SCR MAMMO BI INCL CAD: CPT

## 2024-07-11 PROCEDURE — 77063 BREAST TOMOSYNTHESIS BI: CPT

## 2024-07-11 PROCEDURE — 77063 BREAST TOMOSYNTHESIS BI: CPT | Mod: 26

## 2024-07-12 ENCOUNTER — EMERGENCY (EMERGENCY)
Facility: HOSPITAL | Age: 64
LOS: 0 days | Discharge: HOME | End: 2024-07-12
Attending: EMERGENCY MEDICINE
Payer: MEDICAID

## 2024-07-12 VITALS
RESPIRATION RATE: 16 BRPM | HEART RATE: 78 BPM | DIASTOLIC BLOOD PRESSURE: 82 MMHG | WEIGHT: 222.01 LBS | SYSTOLIC BLOOD PRESSURE: 149 MMHG | OXYGEN SATURATION: 100 % | TEMPERATURE: 98 F

## 2024-07-12 VITALS
DIASTOLIC BLOOD PRESSURE: 76 MMHG | RESPIRATION RATE: 17 BRPM | SYSTOLIC BLOOD PRESSURE: 124 MMHG | OXYGEN SATURATION: 100 % | TEMPERATURE: 98 F | HEART RATE: 65 BPM

## 2024-07-12 DIAGNOSIS — Z88.0 ALLERGY STATUS TO PENICILLIN: ICD-10-CM

## 2024-07-12 DIAGNOSIS — R10.32 LEFT LOWER QUADRANT PAIN: ICD-10-CM

## 2024-07-12 DIAGNOSIS — E78.00 PURE HYPERCHOLESTEROLEMIA, UNSPECIFIED: ICD-10-CM

## 2024-07-12 DIAGNOSIS — Z12.31 ENCOUNTER FOR SCREENING MAMMOGRAM FOR MALIGNANT NEOPLASM OF BREAST: ICD-10-CM

## 2024-07-12 DIAGNOSIS — Z98.890 OTHER SPECIFIED POSTPROCEDURAL STATES: Chronic | ICD-10-CM

## 2024-07-12 DIAGNOSIS — I10 ESSENTIAL (PRIMARY) HYPERTENSION: ICD-10-CM

## 2024-07-12 DIAGNOSIS — E03.9 HYPOTHYROIDISM, UNSPECIFIED: ICD-10-CM

## 2024-07-12 LAB
ALBUMIN SERPL ELPH-MCNC: 4 G/DL — SIGNIFICANT CHANGE UP (ref 3.5–5.2)
ALP SERPL-CCNC: 115 U/L — SIGNIFICANT CHANGE UP (ref 30–115)
ALT FLD-CCNC: 16 U/L — SIGNIFICANT CHANGE UP (ref 0–41)
ANION GAP SERPL CALC-SCNC: 15 MMOL/L — HIGH (ref 7–14)
APPEARANCE UR: CLEAR — SIGNIFICANT CHANGE UP
AST SERPL-CCNC: 27 U/L — SIGNIFICANT CHANGE UP (ref 0–41)
BACTERIA # UR AUTO: ABNORMAL /HPF
BASOPHILS # BLD AUTO: 0.06 K/UL — SIGNIFICANT CHANGE UP (ref 0–0.2)
BASOPHILS NFR BLD AUTO: 0.8 % — SIGNIFICANT CHANGE UP (ref 0–1)
BILIRUB SERPL-MCNC: 0.3 MG/DL — SIGNIFICANT CHANGE UP (ref 0.2–1.2)
BILIRUB UR-MCNC: NEGATIVE — SIGNIFICANT CHANGE UP
BUN SERPL-MCNC: 13 MG/DL — SIGNIFICANT CHANGE UP (ref 10–20)
CALCIUM SERPL-MCNC: 9.1 MG/DL — SIGNIFICANT CHANGE UP (ref 8.4–10.5)
CHLORIDE SERPL-SCNC: 105 MMOL/L — SIGNIFICANT CHANGE UP (ref 98–110)
CO2 SERPL-SCNC: 22 MMOL/L — SIGNIFICANT CHANGE UP (ref 17–32)
COLOR SPEC: YELLOW — SIGNIFICANT CHANGE UP
CREAT SERPL-MCNC: 0.6 MG/DL — LOW (ref 0.7–1.5)
DIFF PNL FLD: NEGATIVE — SIGNIFICANT CHANGE UP
EGFR: 101 ML/MIN/1.73M2 — SIGNIFICANT CHANGE UP
EOSINOPHIL # BLD AUTO: 0.1 K/UL — SIGNIFICANT CHANGE UP (ref 0–0.7)
EOSINOPHIL NFR BLD AUTO: 1.3 % — SIGNIFICANT CHANGE UP (ref 0–8)
EPI CELLS # UR: PRESENT
GLUCOSE SERPL-MCNC: 141 MG/DL — HIGH (ref 70–99)
GLUCOSE UR QL: NEGATIVE MG/DL — SIGNIFICANT CHANGE UP
HCT VFR BLD CALC: 43.4 % — SIGNIFICANT CHANGE UP (ref 37–47)
HGB BLD-MCNC: 14.1 G/DL — SIGNIFICANT CHANGE UP (ref 12–16)
IMM GRANULOCYTES NFR BLD AUTO: 0.4 % — HIGH (ref 0.1–0.3)
KETONES UR-MCNC: NEGATIVE MG/DL — SIGNIFICANT CHANGE UP
LEUKOCYTE ESTERASE UR-ACNC: ABNORMAL
LIDOCAIN IGE QN: 37 U/L — SIGNIFICANT CHANGE UP (ref 7–60)
LYMPHOCYTES # BLD AUTO: 1.64 K/UL — SIGNIFICANT CHANGE UP (ref 1.2–3.4)
LYMPHOCYTES # BLD AUTO: 20.9 % — SIGNIFICANT CHANGE UP (ref 20.5–51.1)
MCHC RBC-ENTMCNC: 26.9 PG — LOW (ref 27–31)
MCHC RBC-ENTMCNC: 32.5 G/DL — SIGNIFICANT CHANGE UP (ref 32–37)
MCV RBC AUTO: 82.8 FL — SIGNIFICANT CHANGE UP (ref 81–99)
MONOCYTES # BLD AUTO: 0.73 K/UL — HIGH (ref 0.1–0.6)
MONOCYTES NFR BLD AUTO: 9.3 % — SIGNIFICANT CHANGE UP (ref 1.7–9.3)
NEUTROPHILS # BLD AUTO: 5.27 K/UL — SIGNIFICANT CHANGE UP (ref 1.4–6.5)
NEUTROPHILS NFR BLD AUTO: 67.3 % — SIGNIFICANT CHANGE UP (ref 42.2–75.2)
NITRITE UR-MCNC: NEGATIVE — SIGNIFICANT CHANGE UP
NRBC # BLD: 0 /100 WBCS — SIGNIFICANT CHANGE UP (ref 0–0)
PH UR: 6 — SIGNIFICANT CHANGE UP (ref 5–8)
PLATELET # BLD AUTO: 271 K/UL — SIGNIFICANT CHANGE UP (ref 130–400)
PMV BLD: 11.1 FL — HIGH (ref 7.4–10.4)
POTASSIUM SERPL-MCNC: 4.4 MMOL/L — SIGNIFICANT CHANGE UP (ref 3.5–5)
POTASSIUM SERPL-SCNC: 4.4 MMOL/L — SIGNIFICANT CHANGE UP (ref 3.5–5)
PROT SERPL-MCNC: 7 G/DL — SIGNIFICANT CHANGE UP (ref 6–8)
PROT UR-MCNC: 30 MG/DL
RBC # BLD: 5.24 M/UL — SIGNIFICANT CHANGE UP (ref 4.2–5.4)
RBC # FLD: 14.2 % — SIGNIFICANT CHANGE UP (ref 11.5–14.5)
RBC CASTS # UR COMP ASSIST: 1 /HPF — SIGNIFICANT CHANGE UP (ref 0–4)
SODIUM SERPL-SCNC: 142 MMOL/L — SIGNIFICANT CHANGE UP (ref 135–146)
SP GR SPEC: 1.02 — SIGNIFICANT CHANGE UP (ref 1–1.03)
UROBILINOGEN FLD QL: 0.2 MG/DL — SIGNIFICANT CHANGE UP (ref 0.2–1)
WBC # BLD: 7.83 K/UL — SIGNIFICANT CHANGE UP (ref 4.8–10.8)
WBC # FLD AUTO: 7.83 K/UL — SIGNIFICANT CHANGE UP (ref 4.8–10.8)
WBC UR QL: 6 /HPF — HIGH (ref 0–5)

## 2024-07-12 PROCEDURE — 74177 CT ABD & PELVIS W/CONTRAST: CPT | Mod: 26,MC

## 2024-07-12 PROCEDURE — 36000 PLACE NEEDLE IN VEIN: CPT | Mod: XU

## 2024-07-12 PROCEDURE — 74177 CT ABD & PELVIS W/CONTRAST: CPT | Mod: MC

## 2024-07-12 PROCEDURE — 99284 EMERGENCY DEPT VISIT MOD MDM: CPT | Mod: 25

## 2024-07-12 PROCEDURE — 99284 EMERGENCY DEPT VISIT MOD MDM: CPT

## 2024-07-12 RX ORDER — SODIUM CHLORIDE 0.9 % (FLUSH) 0.9 %
1000 SYRINGE (ML) INJECTION
Refills: 0 | Status: DISCONTINUED | OUTPATIENT
Start: 2024-07-12 | End: 2024-07-12

## 2024-07-12 RX ADMIN — Medication 125 MILLILITER(S): at 15:20

## 2024-07-15 LAB
CULTURE RESULTS: SIGNIFICANT CHANGE UP
SPECIMEN SOURCE: SIGNIFICANT CHANGE UP

## 2024-08-29 ENCOUNTER — OUTPATIENT (OUTPATIENT)
Dept: OUTPATIENT SERVICES | Facility: HOSPITAL | Age: 64
LOS: 1 days | End: 2024-08-29
Payer: MEDICAID

## 2024-08-29 DIAGNOSIS — Z98.890 OTHER SPECIFIED POSTPROCEDURAL STATES: Chronic | ICD-10-CM

## 2024-08-29 DIAGNOSIS — Z00.8 ENCOUNTER FOR OTHER GENERAL EXAMINATION: ICD-10-CM

## 2024-08-29 DIAGNOSIS — R10.2 PELVIC AND PERINEAL PAIN: ICD-10-CM

## 2024-08-29 PROCEDURE — 72197 MRI PELVIS W/O & W/DYE: CPT

## 2024-08-29 PROCEDURE — 72197 MRI PELVIS W/O & W/DYE: CPT | Mod: 26

## 2024-08-29 PROCEDURE — A9579: CPT

## 2024-08-30 DIAGNOSIS — R10.2 PELVIC AND PERINEAL PAIN: ICD-10-CM

## 2025-01-21 NOTE — ED PROVIDER NOTE - CADM POA PRESS ULCER
[facility]  Advanced Heart Failure, MCS, Transplant and Pulmonary Hypertension Cardiology  LVAD Progress Note    Date: 2025    Patient: John Ghosh   : 1967 MRN: [id number]   Admit Date: 2025 LOS: 14   PCP: Kary Rinaldi MD      SUBJECTIVE: Pt seen and examined this AM, pt sitting upright in chair eating breakfast. Pt expresses the NC dries him out and spoke to nurse about nasal saline. Nurse reports no fluid \"pull of\" from CRRT in anticipation for diuretic challenge, improving CVP compared to 25. Nurse reports pt experiencing \"better spirits\" post conversation of plan and goc which was reiterated by Dr. Anderson.      HPI: Mr. Ghosh is a 57 year old male with PMHx of chronic systolic HF (most recent LVEF 23% on 24), CKD, peripheral arterial disease, and DM2. At the end of 10/2024, he had his ICD replaced by Dr. Silveira at [facility], during which he received 2 mg Versed and 100 mcg fentanyl. He later developed congestion and other HF symptoms requiring IV diuretics but was then discharged home. He then obtained a cardiopulmonary stress test which showed abnormalities suggestive of advanced heart failure. He underwent a RHC accordingly in 2024 but underwent respiratory decompensation during the procedure that required an ICU stay w/ intubation and CRRT. He had since been adequately recovering at home and following up w/ the AHF team. However, he began experiencing more episodes of orthopnea and eventually developed CP/tightness that warranted a visit to [facility] ED on 25. He was given nitroglycerin, which alleviated his symptoms. He has been transferred to [facility] for further evaluation w/ the AHF team.      Mr. Ghosh describes that he has been having PND and orthopnea and more abdominal distention within the last several months. A new tremor has also been noted on his right arm for the past several weeks.     Pt is now s/p HM3 LVAD implant  (1/16/26) w/ temporary ECMO/Protek Duo RVAD.      VAD KEY EVENTS:   1/17/25: Extubated, now on 6 L nasal cannula. PLT transfused o/n. On Lasix 20 mg/hr gtt.   1/18/25:  On nasal cannula 6 L/min. Plan to get medistinal chest tube out. Weaned off primacor. Continue with bumex 2 mg/hr. Initiated vasopressin. Decrease dobutamine to 2 mg/hr. Overnight CRRT was clouded and off momentarily, now planning to initiate with ECMO circuit.   Increase LVAD speed to 6000 RPMs with flows ~6.1 L/min.   1/19/25: Will escalate diuresis to CRRT.   1/20/25: Echo today, wean Dobutamine to 1 mcg and eventually off today, likely transition to HD, diuretic challenge w/ IV Lasix 80 mg x1, Amio 150 mg bolus and start 1 mg/min, DC swan and leave intro.  1/21/25: Increase Hydralazine. Initiating Diuril, Lasix, Coumadin, and Amiodarone.       REVIEW OF SYSTEMS:  Constitutional: Denies fever, chills, fatigue, myalgias, and weakness.   HEENT: Denies headache, vision changes. Denies nose bleeding and gingival bleeding.   Respiratory:  Denies SOB, FITZPATRICK, orthopnea, PND, cough.   Cardiovascular:  Denies chest pain, palpitations. Denies pre-syncope, syncope, lightheadedness, dizziness. Denies LE edema.  Denies recent VAD alarms or ICD shocks.   GI:  Denies abdominal bloating, early satiety. Denies nausea, vomiting, diarrhea, constipation, abdominal pain. Denies bloody or black/tarry stools. Last BM: .  :  Denies dark colored urine, blood in urine. Denies urinary retention, pain with urination, urinary frequency, and nocturia.   Musculoskeletal:  Denies back pain, neck pain, joint pain.  Integument:  Denies redness, pain and drainage at driveline exit site.   Neurologic:  Denies focal weakness or sensory changes.  Denies numbness and tingling.  Endocrine:  Denies polyuria, polydipsia or temperature intolerance.     Past Medical History:   Diagnosis Date    Atrial tachycardia (CMD)     Chronic kidney disease     Congestive cardiac failure  (CMD)      COVID-19     Diabetes mellitus  (CMD)     Essential (primary) hypertension     NICM (nonischemic cardiomyopathy)  (CMD)     Paroxysmal supraventricular tachycardia (CMD)     PVD (peripheral vascular disease) (CMD)     Retinopathy     Sleep apnea     no CPAP used    SSS (sick sinus syndrome)  (CMD)         Past Surgical History:   Procedure Laterality Date    Ablation - atrial tachycardia  05/08/2024    Atherectomy Right 02/13/2023    right CFA atherectomy    Cardiac catherization      2010 and 2020- no cad    Hernia repair      with mesh    Icd implant      Pta Left 01/16/2020    left popliteal/TP trunk    Pta Left 04/17/2023    left SFA        Family History   Family history unknown: Yes        Social History     Socioeconomic History    Marital status: /Civil Union     Spouse name: Not on file    Number of children: Not on file    Years of education: Not on file    Highest education level: Not on file   Occupational History    Not on file   Tobacco Use    Smoking status: Former     Types: Cigarettes    Smokeless tobacco: Never   Vaping Use    Vaping status: never used   Substance and Sexual Activity    Alcohol use: Yes    Drug use: Never    Sexual activity: Not on file   Other Topics Concern    Not on file   Social History Narrative    Not on file     Social Determinants of Health     Financial Resource Strain: Low Risk  (1/8/2025)    Financial Resource Strain     Unable to Get: None   Food Insecurity: Low Risk  (1/7/2025)    Food Insecurity     Worried about Food: Never true     Food is Gone: Never true   Transportation Needs: Not At Risk (1/8/2025)    Transportation Needs     Lack of Reliable Transportation: No   Recent Concern: Transportation Needs - At Risk (1/7/2025)    Transportation Needs     Lack of Reliable Transportation: Yes   Physical Activity: Not on file   Stress: Not on file   Social Connections: Low Risk  (1/7/2025)    Social Connections     Social Connectivity: 5 or more times a week    Interpersonal Safety: Low Risk  (1/7/2025)    Interpersonal Safety     How often physically hurt: Never     How often insulted or talked down to: Never     How often threatened with harm: Never     How often scream or curse at: Never        OBJECTIVE:    Vitals with min/max:  Vital Last Value 24 Hour Range   Temperature 97.9 °F (36.6 °C) (01/21/25 0400) Temp  Min: 97.9 °F (36.6 °C)  Max: 99.5 °F (37.5 °C)   Pulse 88 (01/21/25 0600) Pulse  Min: 83  Max: 155   Respiratory (!) 10 (01/21/25 0600) Resp  Min: 0  Max: 25   Non-Invasive  Blood Pressure 94/85 (01/20/25 0921) BP  Min: 94/85  Max: 94/85   Pulse Oximetry 100 % (01/21/25 0600) SpO2  Min: 90 %  Max: 100 %   Arterial   Blood Pressure 87/73 (01/21/25 0600) Arterial Line BP  Min: 77/61  Max: 97/84           Weight    01/13/25 0600 01/14/25 0300 01/15/25 0500 01/16/25 0600   Weight: 108.9 kg (240 lb 1.3 oz) 107.1 kg (236 lb 1.8 oz) 106.6 kg (235 lb 0.2 oz) 107.9 kg (237 lb 14 oz)          Intake/Output Summary (Last 24 hours) at 1/21/2025 0710  Last data filed at 1/21/2025 0659  Gross per 24 hour   Intake 3519.17 ml   Output 3473 ml   Net 46.17 ml       PAP (mmHg): (35-40)/(22-28) 35/22  CVP:  [16 mmHg-26 mmHg] 16 mmHg     MEDICATIONS:    ALLERGIES:   Allergen Reactions    Lidocaine ANAPHYLAXIS     Had reaction during right heart cath. Patient was given versed and lidocaine and developed acute respiratory distress. Patient was intubated and later was given versed while intubated. Unknown which medication caused the reaction         Current Facility-Administered Medications   Medication Dose Route Frequency Provider Last Rate Last Admin    insulin lispro (ADMELOG, HumaLOG) injection 9 Units  9 Units Subcutaneous TID Goldie Drew DO   9 Units at 01/20/25 1901    hydrALAZINE (APRESOLINE) tablet 50 mg  50 mg Oral 3 times per day Kalee Long MD   50 mg at 01/21/25 0504    polyethylene glycol (MIRALAX) packet 17 g  17 g Oral Daily Kalee Long MD    17 g at 01/20/25 0936    insulin glargine (LANTUS) injection 21 Units  21 Units Subcutaneous Q24H Angelita Benavidesta, DO   21 Units at 01/20/25 1506    insulin lispro (ADMELOG,HumaLOG) - Correction Dose   Subcutaneous Nightly BenavidesAngelita almaguerta, DO        insulin lispro (ADMELOG,HumaLOG) - Correction Dose   Subcutaneous TID WC Angelita Benavidesta, DO   6 Units at 01/20/25 1902    WARFARIN - PHYSICIAN MONITORED 1 each  1 each Does not apply Q Evening Kalee Long MD   1 each at 01/20/25 1815    pantoprazole (PROTONIX) EC tablet 40 mg  40 mg Oral QAM AC Abelardo Banks MD   40 mg at 01/21/25 0506    docusate sodium-sennosides (SENOKOT S) 50-8.6 MG 2 tablet  2 tablet Oral BID Ximena Montalvo PA   2 tablet at 01/20/25 2043    sodium chloride 0.9 % injection 2 mL  2 mL Intracatheter 2 times per day Ximena Montalvo PA   2 mL at 01/20/25 2043    heparin (porcine) injection 5,000 Units  5,000 Units Subcutaneous 3 times per day Ximena Montalvo PA   5,000 Units at 01/19/25 0526    gabapentin (NEURONTIN) capsule 300 mg  300 mg Oral BID Ximena Montalvo PA   300 mg at 01/20/25 2043    atorvastatin (LIPITOR) tablet 40 mg  40 mg Oral Nightly Yara Garcia MD   40 mg at 01/20/25 2043        Current Facility-Administered Medications   Medication Dose Route Frequency Provider Last Rate Last Admin    HYDROcodone-acetaminophen (NORCO)  MG per tablet 1 tablet  1 tablet Oral Q4H PRN DonSondra CNP        HYDROcodone-acetaminophen (NORCO) 5-325 MG per tablet 1 tablet  1 tablet Oral Q4H PRN DonSondra arciniega CNP   1 tablet at 01/20/25 0552    potassium phosphate 20 mmol in sodium chloride 0.9 % 250 mL IVPB  20 mmol Intravenous Q12H PRN Timoteo San MD        Or    sodium PHOSPHATE 20 mmol in sodium chloride 0.9 % 250 mL IVPB  20 mmol Intravenous Q12H PRN Timoteo San MD 62.5 mL/hr at 01/21/25 0616 20 mmol at 01/21/25 0616    magnesium sulfate 2 g in 50 mL premix IVPB  2 g Intravenous Q12H PRN  Timoteo San MD        potassium CHLORIDE 20 MEQ/50ML IVPB premix 20 mEq  20 mEq Intravenous PRN Timoteo San MD        potassium CHLORIDE 20 MEQ/50ML IVPB premix 20 mEq  20 mEq Intravenous PRN Timoteo San MD        sodium bicarbonate 8.4 % injection 50 mEq  50 mEq Intravenous PRN Timoteo San MD        sodium chloride 0.9 % injection 10 mL  10 mL Intracatheter PRN Timoteo San MD        sodium chloride (NORMAL SALINE) 0.9 % bolus 1,000 mL  1,000 mL CRRT PRN Timoteo San MD        calcium gluconate 1 g in sodium chloride 50 mL IVPB  1 g Intravenous PRN Timoteo San MD        ALPRAZolam (XANAX) tablet 0.25 mg  0.25 mg Oral Q6H PRN Abelardo Banks MD   0.25 mg at 01/20/25 1814    alteplase (CATHFLO ACTIVASE) injection 2 mg  2 mg Intracatheter PRN Kalee Long MD   2 mg at 01/18/25 0005    acetaminophen (TYLENOL) tablet 650 mg  650 mg Oral Q6H PRN Ximena Montalvo PA        Or    acetaminophen (TYLENOL) suppository 650 mg  650 mg Rectal Q6H PRN Ximena Montalvo PA        ondansetron (ZOFRAN ODT) disintegrating tablet 4 mg  4 mg Oral Q12H PRN Ximena Montalvo PA        Or    ondansetron (ZOFRAN) injection 4 mg  4 mg Intravenous Q12H PRN Ximena Montalvo PA   4 mg at 01/17/25 2310    chlorhexidine gluconate (PERIDEX) 0.12 % solution 15 mL  15 mL Swish & Spit PRN Ximena Montalvo PA        potassium CHLORIDE (KLOR-CON M) elle ER tablet 40 mEq  40 mEq Oral PRN Ximena Montalvo PA        Or    potassium CHLORIDE (KLOR-CON) packet 40 mEq  40 mEq Per NG Tube PRN Ximena Montalvo PA        potassium CHLORIDE (KLOR-CON M) elle ER tablet 20 mEq  20 mEq Oral PRN Ximena Montalvo PA   20 mEq at 01/21/25 0614    Or    potassium CHLORIDE (KLOR-CON) packet 20 mEq  20 mEq Per NG Tube PRN Ximena Montalvo PA        magnesium oxide (MAG-OX) tablet 400 mg  400 mg Oral BID PRN Ximena Montalvo PA   400 mg at 01/20/25 0510    bisacodyl  (DULCOLAX) suppository 10 mg  10 mg Rectal Daily PRN Ximena Montalvo PA   10 mg at 25 0511    magnesium hydroxide (MILK OF MAGNESIA) 400 MG/5ML suspension 30 mL  30 mL Oral Q12H PRN Ximena Montalvo PA   30 mL at 25 0527    sodium biphosphate (FLEET) enema  1 enema Rectal Once PRN Ximena Montalvo PA        dexMEDEtomidine (PRECEDEX) 400 mcg/100 mL in sodium chloride 0.9 % infusion  0-1.5 mcg/kg/hr (Dosing Weight) Intravenous Continuous PRN Ximena Montalvo PA        sodium chloride 0.9 % injection 10 mL  10 mL Intravenous PRN Ximena Montalvo PA        albumin human injection 12.5 g  12.5 g Intravenous PRN Ximena Montalvo PA        dextrose 50 % injection 25 g  25 g Intravenous PRN Ximena Montalvo PA        dextrose 50 % injection 12.5 g  12.5 g Intravenous PRN Ximena Montalvo PA        glucagon (GLUCAGEN) injection 1 mg  1 mg Intramuscular PRN Ximena Montalvo PA        dextrose (GLUTOSE) 40 % gel 15 g  15 g Oral PRN Ximena Montalvo PA        dextrose (GLUTOSE) 40 % gel 30 g  30 g Oral PRN Ximena Montalvo PA            PHYSICAL EXAMINATION:  Constitutional:  Alert and Oriented. Pt in no acute distress.  Cardiovascular: +Tachycardia Audible VAD Tones.  + JVD, - HJR  Respiratory:  No respiratory distress. Clear to auscultation, no rales/rhonchi, no wheezing..   GI: Abdomen soft, non-tender and non-distended. Bowel sounds normal.  Integumentary:  Warm. Dry. No rash. Driveline site CDI. Gerald intact.   Extremities: BLE w/No edema. All extremities warm and well perfused.  Neurologic:  Alert & oriented x 3. Normal motor function. Normal sensory function. No focal deficits noted.   Psychiatric: Cooperative, appropriate mood and affect.    VAD INTERROGATION  Device Type: Heartmate III  Implant Date: 2025  Flow: 6.2 L/min Pump Speed: 6000 PI: 1.8 Power: 4.8 W   Low Speed Settin HCT: *     Back-up battery used 0 times for 0 minutes  Change Back-up  Battery in  months  Abnormal Trends: None  Alarms: None  Changes Made: None  Log Files Sent: No    ECMO/PROTEK DUO INTERROGATION  Flow: 4.1 L/min Pump Speed: 4400 FiO2: 100% Sweep: 4         LABS, IMAGING, CARDIAC TESTING:  CMP and Magnesium:  Recent Labs   Lab 01/21/25  0309 01/20/25  1627 01/20/25  0322 01/19/25  1646 01/19/25  0430 11/20/24  1657 11/15/24  0926   Glucose 135* 262* 234*   < > 188*   < > 157*   Sodium 134* 132* 132*   < > 131*   < > 138   Potassium 3.7 4.0 4.1   < > 4.5   < > 3.9   Chloride 102 101 100   < > 100   < > 101   Carbon Dioxide 29 27 25   < > 23   < > 28   Anion Gap 7 8 11   < > 13   < > 9   BUN 25* 26* 32*   < > 32*   < > 27*   Creatinine 1.32* 1.29* 1.74*   < > 2.04*   < > 1.5*   GFR,ESTIMATE  --   --   --   --   --   --  54*   CALCIUM  --   --   --   --   --   --  9.3   Calcium 8.6 9.0 9.5   < > 9.4   < >  --    TOTAL BILIRUBIN  --   --   --   --   --   --  0.9*   Bilirubin, Total 0.7  --  0.7  --  1.1*   < >  --    AST/SGOT  --   --   --   --   --   --  13   GOT/AST 34  --  42*  --  59*   < >  --    ALT/SGPT  --   --   --   --   --   --  13   GPT 21  --  23  --  25   < >  --    ALK PHOSPHATASE  --   --   --   --   --   --  92   Alkaline Phosphatase 111  --  90  --  88   < >  --    TOTAL PROTEIN  --   --   --   --   --   --  7.2   Protein, Total 5.8*  --  6.2*  --  6.5   < >  --    Albumin 2.2*  --  2.6*  --  2.6*   < > 4.3   Globulin 3.6  --  3.6  --  3.9   < >  --    A/G Ratio 0.6*  --  0.7*  --  0.7*   < >  --    Magnesium 2.0 2.1 1.9   < > 1.9   < >  --     < > = values in this interval not displayed.       CBC:  Recent Labs   Lab 01/21/25  0309 01/20/25  1136 01/20/25  0322 01/19/25  1647   WBC 9.7  --  12.3* 13.3*   RBC 2.72*  --  2.59* 2.75*   HGB 7.8* 7.9* 7.5* 7.9*   HCT 24.8* 24.6* 23.7* 25.0*   MCV 91.2  --  91.5 90.9   MCH 28.7  --  29.0 28.7   MCHC 31.5*  --  31.6* 31.6*   RDW-CV 14.9  --  15.0 15.0   RDW-SD 49.1  --  50.0 49.8   PLT 69*  --  83* 92*   NRBC 0  --  0 0    Neutrophil, Percent 71  --  77 79   Lymphocytes, Percent 7  --  7 5   Mono, Percent 16  --  12 12   Eosinophils, Percent 3  --  3 2   Basophils, Percent 1  --  0 1   Immature Granulocytes 2  --  1 1   Absolute Neutrophils 6.9  --  9.4* 10.6*   Absolute Lymphocytes 0.7*  --  0.9* 0.7*   Absolute Monocytes 1.5*  --  1.5* 1.7*   Absolute Eosinophils  0.3  --  0.3 0.3   Absolute Basophils 0.1  --  0.0 0.1   Absolute Immature Granulocytes 0.2  --  0.1 0.1       INR:  Recent Labs   Lab 25  0309 25  0322 25  0430   INR 1.0 1.0 1.1   Protime- PT 10.9 10.9 11.6   PTT 33* 29 33*       LDH:  Recent Labs   Lab 25  0309 25  0322 25  0430   LD, Total 354* 359* 413*       Cultures:  Drive Line Culture & Bacterial, Blood Cultures:    Recent Labs   Lab 24  0043 24  2354 24  2230 24  2034   Culture, Blood or Bone Marrow No Growth 5 Days. No Growth 5 Days.  --  Cutibacterium acnes*   Gram Stain  --   --  Moderate Polymorphonuclear cells.  Rare Gram negative bacilli.  Rare Gram positive cocci  Very Rare Epithelial cells. Gram positive bacilli.*       EC/21/25      CXR:   Results for orders placed or performed during the hospital encounter of 25   XR CHEST AP OR PA    Narrative    Exam: XR CHEST AP OR PA.    Indication: line placement    Comparison: Today at 0357 hours.    Findings: Single view of the chest demonstrates stable  cardiomediastinal  silhouette. New left central line appears to terminate at the junction of  brachiocephalic and SVC but is poorly visualized given multiple overlying  lines and tubes. Remaining support lines are stable. Stable bilateral  pleural-parenchymal disease no definite pneumothorax. The thoracic  musculoskeletal structures and the upper abdomen are stable.      Impression    Impression:  Stable exam as above. Support devices as above.    Electronically Signed by: Antionette Gordon MD   Signed on: 2025 3:22 PM   Workstation ID:  [id number]        No results found for this or any previous visit.    Echocardiogram:    Results for orders placed or performed during the hospital encounter of 25   TRANSTHORACIC ECHO (TTE) COMPLETE W/ W/O IMAGING AGENT   Result Value Ref Range    Ejection Fraction 25%     Ascending Aorta 3     Tricuspid valve annular peak velocity 19     PEACE LVOT Peak Gradient 1.4     LV end diastolic posterior wall thickness 2D 4.8     Left Ventricular Internal Dimension in Diastole 4.7     Left Internal Dimenson in Systole 1.7     Impression    *[facility]*  [address] [address] [phone]  Transthoracic Echocardiogram (TTE)    Patient: John Ghosh     Study Date/Time:    2025 9:29AM  MRN:     [id number]                   FIN#:               [id number]  :     1967                 Ht/Wt:              193cm 107.9kg  Age:     57                         BSA/BMI:            2.42m^2 29kg/m^2  Gender:  M                          Baseline BP:        86 / 71  *Ordering Physician:* Gavin Londono MD     *Referring Physician:* Gavin Londono ; Maddy Christianson     *Attending Physician:* Maddy Christianson     *Diagnostic Physician:* Paz Davalos MD  *Sonographer:* Chandana Cota RDCS     ------------------------------------------------------------------------------  INDICATIONS:   S/p heartmate and ECMO insertion.  LVAD; 6000 RPM, 6.1 L/min.  ECMO; 4300 RPM, 3.9 L/min.    ------------------------------------------------------------------------------  STUDY CONCLUSIONS  SUMMARY:    1. Left ventricle: The cavity size is normal. Wall thickness is moderately to     severely increased. Systolic function is severely reduced. The ejection     fraction was measured by single plane method of disks. A left ventricular     assist device (LVAD) is visualized, with an inflow cannula at the apex. The     baseline LVAD speed is 6000 rpm. The  ejection fraction is 25%.  2. Aortic valve: In correlation with the LVAD, the leaflets open     intermittently.  3. Mitral valve: There is trace regurgitation.  4. Right ventricle: The cavity size is normal. Systolic function is mildly     reduced. Extracorporeal membrane oxygenator; 4300 RPM, 3.9 L/min.  5. Tricuspid valve: There is severe regurgitation.  6. Pericardium, extracardiac: A small pericardial effusion is identified     anterior to the heart.    ------------------------------------------------------------------------------  STUDY DATA:  Patient room number: KELSY-20.  Procedure:  A transthoracic  echocardiogram was performed. Image quality was good.  M-mode, complete 2D,  complete spectral Doppler, and color Doppler.  Study status:  STAT.  Study  completion:  There were no complications.    FINDINGS    BASELINE ECG:   Tachycardia.  LEFT VENTRICLE:  The cavity size is normal. Wall thickness is moderately to  severely increased. Systolic function is severely reduced. There is severe  diffuse hypokinesis. Unable to accurately assess left ventricular diastolic  function parameters due to LVAD. A left ventricular assist device (LVAD) is  visualized, with an inflow cannula at the apex. The baseline LVAD speed is  6000 rpm.    The ejection fraction was measured by single plane method of  disks. The ejection fraction is 25%. Cannot assess LV diastolic function.    AORTIC VALVE:  The annulus is normal. The valve is trileaflet. The leaflets  are mildly thickened. There is no regurgitation.  Doppler:  In correlation  with the LVAD, the leaflets open intermittently.    AORTA:  Aortic root: The root is normal-sized.    MITRAL VALVE:  The annulus is normal. The leaflets are normal thickness.  Inflow velocity is within the normal range. There is no evidence for stenosis.  There is trace regurgitation.    LEFT ATRIUM:  The atrium is normal in size.    RIGHT VENTRICLE:  The cavity size is normal. Systolic function is  mildly  reduced. The estimated peak pressure is 16mm Hg. Extracorporeal membrane  oxygenator; 4300 RPM, 3.9 L/min.       The RV pressure during systole is 19mm  Hg.    PULMONIC VALVE:   The leaflets are normal thickness. Velocity is within the  normal range. There is no evidence for stenosis. There is no regurgitation.    TRICUSPID VALVE:  The valve is structurally normal. The leaflets are normal  thickness. Inflow velocity is within the normal range. There is no evidence  for stenosis. There is severe regurgitation.    RIGHT ATRIUM:  The atrium is normal in size. Catheter noted in right atrium.      The estimated central venous pressure is 3mm Hg.    PERICARDIUM:  A small pericardial effusion is identified anterior to the  heart.    SYSTEMIC VEINS:  Inferior vena cava: The IVC is dilated.  Respirophasic diameter changes are in  the normal range (>= 50%).    ------------------------------------------------------------------------------  Measurements     Left ventricle            Value        Ref       01/18/2025  Right ventricle          Value          Ref       01/18/2025   TAJ, LAX chord        (N) 4.8   cm     4.2 - 5.8 4.2         TAJ, LAX                 3.9   cm       --------- 3.5   ESD, LAX chord        (H) 4.7   cm     2.5 - 4.0 3.9         Pressure, S              19    mm Hg    --------- ----------   TAJ/bsa, LAX chord    (L) 2.0   cm/m^2 2.2 - 3.0 1.7   ESD/bsa, LAX chord    (N) 1.9   cm/m^2 1.3 - 2.1 1.6         Left atrium              Value          Ref       01/18/2025   PW, ED, LAX           (H) 1.4   cm     0.6 - 1.0 1.4         AP dim, ES           (H) 4.8   cm       3.0 - 4.0 4.8   TAJ major ax, A4C         8.7   cm     --------- 9.4         AP dim index, ES     (N) 2.0   cm/m^2   1.5 - 2.3 2.0   ESD major ax, A4C         8.1   cm     --------- 9.0         Area ES, A4C         (H) 25    cm^2     <=20      19   FS major axis, A4C        6     %      --------- 4           Area/bsa ES, A4C          10.4  cm^2/m^2 --------- 8.02   TAJ/bsa major ax, A4C     3.6   cm/m^2 --------- 3.9         Vol, S               (H) 80    ml       18 - 58   75   ESD/bsa major ax, A4C     3.3   cm/m^2 --------- 3.7         Vol/bsa, S           (N) 33    ml/m^2   16 - 34   31   ROSARIO, A4C                  41.3  cm^2   --------- 41.9        Vol, ES, 1-p A4C     (H) 80    ml       18 - 58   54   BLAISE, A4C                  35.1  cm^2   --------- 36.8        Vol/bsa, ES, 1-p A4C (N) 33    ml/m^2   12 - 37   22   FAC, A4C                  15    %      --------- 12   IVS, ED               (H) 1.7   cm     0.6 - 1.0 1.4         Tricuspid valve          Value          Ref       01/18/2025   PW, ED                (H) 1.4   cm     0.6 - 1.0 1.4         TR peak v            (N) 2     m/sec    <=2.8     1.9   IVS/PW, ED                1.24         --------- 0.99        Peak RV-RA grad, S       16    mm Hg    --------- 14   EDV                   (N) 107   ml     62 - 150  73   ESV                   (H) 104   ml     21 - 61   58          Pulmonary artery         Value          Ref       01/18/2025   EF                    (L) 25    %      52 - 72   18          Pressure, S              19    mm Hg    --------- ----------   SV                        3     ml     --------- 13   EDV/bsa               (N) 44    ml/m^2 34 - 74   30          Systemic veins           Value          Ref       01/18/2025   ESV/bsa               (H) 43    ml/m^2 11 - 31   24          Estimated CVP            3     mm Hg    --------- ----------   SV/bsa                    1     ml/m^2 --------- 5   SV, 1-p A4C               41    ml     --------- 34   SV/bsa, 1-p A4C           17    ml/m^2 --------- 14   ESV, 2-p              (H) 125   ml     21 - 61   105   ESV/bsa, 2-p          (H) 52    ml/m^2 11 - 31   44  Legend:  (L)  and  (H)  shruthi values outside specified reference range.    (N)  marks values inside specified reference range.    Prepared and electronically signed  by  Paz Davalos MD  01/20/2025 11:51       Right Heart Catheterization:  Results for orders placed or performed during the hospital encounter of 01/07/25   Cath/PV Case    Narrative    CONCLUSIONS:    1.  Altered mental status, hypotension and clinical shock during right   heart catheterization.  Nearly identical response as previous right heart   catheterization with resolution after the administration of vasopressor   agents to increase blood pressure as well as intravenous milrinone.    2.  Prior to the above only pulmonary pressures were measured which were   moderate to severely elevated.  Cardiac output was increased but may be   falsely elevated due to the underlying pathophysiology detailed above.      RECOMMENDATIONS:     ICU team to assist in management as patient is critically ill  Wean pressors as able but if continues to demonstrate ongoing shock with   rising lactate may need intra-aortic balloon pump  If clinically improves could then reassess need for inotropes and assess   hemodynamics off of inotropes and at bedside  If hemodynamics remain normal given cardiopulmonary stress test findings   and symptoms with recurrent heart failure could assess hemodynamics post   ambulation  Ongoing workup for advanced therapies  A lot of care was taken prior to this procedure given his previous   response which was nearly identical.  I have concerns that he cannot   tolerate fentanyl for which he only received 50 mcg in 25 mcg doses and   demonstrated resultant severe hypotension.  I believe that he shows   limited hemodynamic reserve given his underlying cardiac status.  Additional care was taken to avoid any other agents that may have produced   an allergic response which was also ruled out by members of our allergy   team           IMPRESSION AND PLAN:    Cardiogenic Shock  Right Ventricular Failure  Durable LVAD Present  Temporary RVAD Present  Chronic NICM HFrEF s/p HM3 on 1/16/25 as Destination Therapy  (DT) due to PAD .   - Etiology: unclear but combination of mixed nonobstructive ischemic, nonischemic diabetic, idiopathic or familial   - Pre-VAD Therapy: IABP (removed 11/21/24) and Inotropes.  - NYHA class: NYHA IV  - Cardio Diagnostics:   - TTE 11/20/24: EF 23%, LV has dilated cavity w/ mildly increased wall thickness. Severe hypokinesis of basal-midinferolateral and inferior walls. RV has dilated cavity w/reduced systolic function. Respirophasic diameter blunted. Small left pericardial effusion identified circumferential to the heart.   - TTE (1/17/25): @ 5700 RPM, LVEF 15%, AoV opens intermittently, mild VT, mod-severe TR,   -  Echo (1/20/25): preliminary review w/ no evidence of suction. EF 25%, LV has moderately to severely reduced wall thickness. AoV has mildly thickened leaflets. RV has mildly reduced systolic function, MV has trace regurgitation, TV has severe regurgitation. Small pericardial effusion identified.  - [facility] may 2010- no CAD  - C 7/13/20: no CAD  - Dobutamine weaned and discontinued 1/20/25  - LVAD: LVAD function stable. See VAD Interrogation above.   - Speed Change:   - 1/18/25: 5700 --> 6000 RPMs   - RVAD: RVAD w/VV ECMO placed 1/16/25.   -  4300 --> 4400 RPM (1/20/25)  - Volume Status: Appears euvolemic on exam  - Anticoag: INR at goal. INR Goal 2-3  - On Coumadin, No ASA d/t PAD  - LDH: Stable. No signs of hemolysis  - MAP/BP: MAPs at goal. Palpable pulse. Goal MAP 70s-90s.  GDMT  - Beta Blocker: None  - ACE/ARB/ARNI: None  - MRA: None  - Diuretic: Lasix 40 IV TID, ; on CRRT, plans to transition to HD   - SGLT-2: None  - Vasodilator/Nitrates: Hydralazine 100 mg PO TID   - RV Support: None  - Pressors: None  - CCB: Cardene gtt  - Antiarrhythmic: Amiodarone 0.5 mg/min, Amiodarone 400 PO BID.  - Anticoagulant: Heparin subQ q8H, Coumadin  - Inotropes: None  - Other: Atorvastatin 40 mg PO QD nightly    - Cardiac Device Therapy: ICD w/o CRT given narrow QRS complex    - Driveline Dressing  Change Frequency/Type: Sterile wet kit, Daily    - VAD Education:  - VAD Class: Not yet completed. Not yet scheduled. Awaiting  .   - DL Dressing Change:  On 1/16/25, Patient has attempted DL dressing change but has not been signed off.      #Hx possible anaphylaxis during procedure   - Pt was undergoing RHC on 11/14/2024.   - Pt received subcu lidocaine and 1 mg Versed  - Occurred while laying flat. Concern of flash pulm. Edema given precipitous decline initiated by an emetic episode, severe itching, and consequent respiratory distress.     #Acute Kidney Injury on CKD  - Previously on CRRT (11/21-11/23).  - Restarted CRRT 1/17/25 in setting of likely ATN post op  - Making about 50 cc/hr of urine. Plan to transition to HD.  - Nephrology recs appreciated.     #PAD   - (2/13/23): R CFA ATHERECTOMY. HAS RESIDUAL 70% L SFA. (ICD-443.9) (JDT90-H60.9)  PAD: 1/16/20: PTA of L popliteal and TP trunk           2/13/23: R CFA atherectomy..           4/17/23: L SFA PTA   - Currently no symptoms of CLI  - Off Heparin gtt given weak positive PF4. Continue on Coumadin post-LVAD.     #DM TYPE II   - Patient claims that he may have had retinal treatment in the past but his vision is good  - He does have peripheral arterial disease which has been treated detailed above  - No neuropathy  - A1c 7.1 @ recent endocrine visit  - Continue to follow, mgmt per Endocrine      TODAY'S PLAN:  - S/p HM3 LVAD w/ ECMO/Protek Duo RVAD on (1/16/25). Post op course c/b LORY and worsening RV requiring increase in RVAD support w/ inotropes and initiation of CRRT on 1/17/25 (POD 1).  - VAD interrogation stable. Continue current LVAD speed at 6000 RPMs flowing 6.2 L/min.  - Continue current RVAD/ECMO speed at 4400 RPMs flowing ~4.1 L/min. Plans to wean next week.  - Hemodynamics 1/21/25 CVP 14, PAP 35/22, PA 27  - Echo (1/20/25): preliminary review w/ no evidence of suction. EF 25%, LV has moderately to severely reduced wall thickness. AoV has mildly  thickened leaflets. RV has mildly reduced systolic function, MV has trace regurgitation, TV has severe regurgitation. Small pericardial effusion identified.    - Start Lasix 40 mg IV TID and Diuril 250 IV BID challenge per Nephro and if positive can try off cvvh   - Makes about 50 cc/hr of urine, CVP 16-20, CREATININE: 1.32 this AM. Agree with transitioning to HD and diuretic challenge today; will give IV Lasix 80 mg x1 and assess response. Nephrology recs appreciated.  - EKG w/ ST/aflutter w/ occasional VPCs. Given Amiodarone bolus 150 mg x1 (1/20/25) and continue  0.5 mg/min. Plan to transition and start Amiodarone 400 mg PO BID 1/21/25. Monitor tele.  - Give x1 U of PRBCs 1/21/25  - MAPs at goal. Palpable pulse. Goal MAP 70s-90s.  - On Cardene 10 mg/hr, wean as tolerated. Increase Hydralazine 50 to 100 mg PO TID as indicated.  - Extubated since (1/17/25), 6L nasal cannula. Plan to wean nitric next week, currently 10 Jeff  - Monitor for bleeding. May consider Plavix per CV recs.   - LDH stable. No signs of hemolysis  - INR: 1.0 today. Continue Coumadin dosing for INR Goal 2-3 per CV Sx.      Charting performed by martín STRONG for Dr. Anderson.    The documentation recorded by the yaire accurately and completely reflects the service(s) I personally performed and the decisions made by me.        Total critical care time: 35 minutes  Due to a high probability of clinically significant, life-threatening deterioration, the patient required my highest level of preparedness to intervene emergently and I have personally spent the critical care time (excluding billable procedure time) directly and personally managing the patient.     Jose G Anderson M.D.  Medical Director, Heart Transplantation and Mechanical Assistance  [facility] [address], [address] [place] [address]  Tel. [phone]  Answering Service        No

## 2025-04-03 NOTE — PRE-ANESTHESIA EVALUATION ADULT - BSA (M2)
The recording was initiated after a verbal consent was obtained from the patient to record this visit for documentation in their clinical record.         Health Maintenance       HPV Vaccine (3 - 3-dose series)  Overdue since 11/12/2010    Hepatitis B Vaccine (2 of 2 - CpG 2-dose series)  Overdue since 10/27/2023           Following review of the above:  Patient wishes to discuss with clinician:      Note: Refer to final orders and clinician documentation.        2.04

## 2025-04-14 ENCOUNTER — OUTPATIENT (OUTPATIENT)
Dept: OUTPATIENT SERVICES | Facility: HOSPITAL | Age: 65
LOS: 1 days | End: 2025-04-14
Payer: MEDICAID

## 2025-04-14 VITALS
DIASTOLIC BLOOD PRESSURE: 78 MMHG | RESPIRATION RATE: 16 BRPM | OXYGEN SATURATION: 100 % | SYSTOLIC BLOOD PRESSURE: 138 MMHG | HEIGHT: 67 IN | HEART RATE: 78 BPM | WEIGHT: 203.05 LBS | TEMPERATURE: 98 F

## 2025-04-14 DIAGNOSIS — Z98.890 OTHER SPECIFIED POSTPROCEDURAL STATES: Chronic | ICD-10-CM

## 2025-04-14 DIAGNOSIS — Z90.89 ACQUIRED ABSENCE OF OTHER ORGANS: Chronic | ICD-10-CM

## 2025-04-14 DIAGNOSIS — Z01.818 ENCOUNTER FOR OTHER PREPROCEDURAL EXAMINATION: ICD-10-CM

## 2025-04-14 DIAGNOSIS — M24.575 CONTRACTURE, LEFT FOOT: ICD-10-CM

## 2025-04-14 LAB
A1C WITH ESTIMATED AVERAGE GLUCOSE RESULT: 5.9 % — HIGH (ref 4–5.6)
ALBUMIN SERPL ELPH-MCNC: 4.5 G/DL — SIGNIFICANT CHANGE UP (ref 3.5–5.2)
ALP SERPL-CCNC: 83 U/L — SIGNIFICANT CHANGE UP (ref 30–115)
ALT FLD-CCNC: 16 U/L — SIGNIFICANT CHANGE UP (ref 0–41)
ANION GAP SERPL CALC-SCNC: 13 MMOL/L — SIGNIFICANT CHANGE UP (ref 7–14)
AST SERPL-CCNC: 21 U/L — SIGNIFICANT CHANGE UP (ref 0–41)
BASOPHILS # BLD AUTO: 0.05 K/UL — SIGNIFICANT CHANGE UP (ref 0–0.2)
BASOPHILS NFR BLD AUTO: 0.7 % — SIGNIFICANT CHANGE UP (ref 0–1)
BILIRUB SERPL-MCNC: 0.4 MG/DL — SIGNIFICANT CHANGE UP (ref 0.2–1.2)
BUN SERPL-MCNC: 11 MG/DL — SIGNIFICANT CHANGE UP (ref 10–20)
CALCIUM SERPL-MCNC: 10.2 MG/DL — SIGNIFICANT CHANGE UP (ref 8.4–10.5)
CHLORIDE SERPL-SCNC: 103 MMOL/L — SIGNIFICANT CHANGE UP (ref 98–110)
CO2 SERPL-SCNC: 26 MMOL/L — SIGNIFICANT CHANGE UP (ref 17–32)
CREAT SERPL-MCNC: 0.6 MG/DL — LOW (ref 0.7–1.5)
EGFR: 101 ML/MIN/1.73M2 — SIGNIFICANT CHANGE UP
EGFR: 101 ML/MIN/1.73M2 — SIGNIFICANT CHANGE UP
EOSINOPHIL # BLD AUTO: 0.09 K/UL — SIGNIFICANT CHANGE UP (ref 0–0.7)
EOSINOPHIL NFR BLD AUTO: 1.2 % — SIGNIFICANT CHANGE UP (ref 0–8)
ESTIMATED AVERAGE GLUCOSE: 123 MG/DL — HIGH (ref 68–114)
GLUCOSE SERPL-MCNC: 81 MG/DL — SIGNIFICANT CHANGE UP (ref 70–99)
HCT VFR BLD CALC: 44.3 % — SIGNIFICANT CHANGE UP (ref 37–47)
HGB BLD-MCNC: 14.4 G/DL — SIGNIFICANT CHANGE UP (ref 12–16)
IMM GRANULOCYTES NFR BLD AUTO: 0.3 % — SIGNIFICANT CHANGE UP (ref 0.1–0.3)
LYMPHOCYTES # BLD AUTO: 2.35 K/UL — SIGNIFICANT CHANGE UP (ref 1.2–3.4)
LYMPHOCYTES # BLD AUTO: 30.7 % — SIGNIFICANT CHANGE UP (ref 20.5–51.1)
MCHC RBC-ENTMCNC: 26.7 PG — LOW (ref 27–31)
MCHC RBC-ENTMCNC: 32.5 G/DL — SIGNIFICANT CHANGE UP (ref 32–37)
MCV RBC AUTO: 82 FL — SIGNIFICANT CHANGE UP (ref 81–99)
MONOCYTES # BLD AUTO: 0.82 K/UL — HIGH (ref 0.1–0.6)
MONOCYTES NFR BLD AUTO: 10.7 % — HIGH (ref 1.7–9.3)
NEUTROPHILS # BLD AUTO: 4.32 K/UL — SIGNIFICANT CHANGE UP (ref 1.4–6.5)
NEUTROPHILS NFR BLD AUTO: 56.4 % — SIGNIFICANT CHANGE UP (ref 42.2–75.2)
NRBC BLD AUTO-RTO: 0 /100 WBCS — SIGNIFICANT CHANGE UP (ref 0–0)
PLATELET # BLD AUTO: 226 K/UL — SIGNIFICANT CHANGE UP (ref 130–400)
PMV BLD: 12.4 FL — HIGH (ref 7.4–10.4)
POTASSIUM SERPL-MCNC: 4.3 MMOL/L — SIGNIFICANT CHANGE UP (ref 3.5–5)
POTASSIUM SERPL-SCNC: 4.3 MMOL/L — SIGNIFICANT CHANGE UP (ref 3.5–5)
PROT SERPL-MCNC: 7.4 G/DL — SIGNIFICANT CHANGE UP (ref 6–8)
RBC # BLD: 5.4 M/UL — SIGNIFICANT CHANGE UP (ref 4.2–5.4)
RBC # FLD: 14.9 % — HIGH (ref 11.5–14.5)
SODIUM SERPL-SCNC: 142 MMOL/L — SIGNIFICANT CHANGE UP (ref 135–146)
WBC # BLD: 7.65 K/UL — SIGNIFICANT CHANGE UP (ref 4.8–10.8)
WBC # FLD AUTO: 7.65 K/UL — SIGNIFICANT CHANGE UP (ref 4.8–10.8)

## 2025-04-14 PROCEDURE — 93010 ELECTROCARDIOGRAM REPORT: CPT

## 2025-04-14 PROCEDURE — 36415 COLL VENOUS BLD VENIPUNCTURE: CPT

## 2025-04-14 PROCEDURE — 99214 OFFICE O/P EST MOD 30 MIN: CPT | Mod: 25

## 2025-04-14 PROCEDURE — 85025 COMPLETE CBC W/AUTO DIFF WBC: CPT

## 2025-04-14 PROCEDURE — 83036 HEMOGLOBIN GLYCOSYLATED A1C: CPT

## 2025-04-14 PROCEDURE — 93005 ELECTROCARDIOGRAM TRACING: CPT

## 2025-04-14 PROCEDURE — 80053 COMPREHEN METABOLIC PANEL: CPT

## 2025-04-14 NOTE — H&P PST ADULT - REASON FOR ADMISSION
63 yo female presents for PAST in preparation for left cuboid osteotomy, medial cuneiform osteotomy, multiple midfoot joint fusions, with hardware fixation on 5/1/2025 under general anesthesia by Dr. Malone ([facility]).

## 2025-04-14 NOTE — H&P PST ADULT - HISTORY OF PRESENT ILLNESS
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 She reports pain in her left foot that has been worsening. She reports pain is worse with ambulation and when she wears certain shoes. She has tried other conservative methods to address the pain but denies any relief. She denies any other concerns. Now proceeding with above.    Denies any chest pain, difficulty breathing, SOB, palpitations, dysuria, URI, or any other infections in the last 2 weeks. Denies any suicidal or homicidal ideations. SLICK reviewed with patient.     Endorses this is their full medical & surgical history including medications prescribed. Pt verbalized understanding of all pre-operative instructions and was given the opportunity to ask questions and have them answered. They were instructed to follow up with their surgeon/proceduralists office with any additional questions regarding their procedure.    Anesthesia Alert  NO--Difficult Airway  NO--History of neck surgery or radiation  NO--Limited ROM of neck  NO--History of Malignant hyperthermia  NO--No personal or family history of Pseudocholinesterase deficiency.  NO--Prior Anesthesia Complication  NO--Latex Allergy  NO--Loose teeth  NO--History of Rheumatoid Arthritis  NO--SLICK  NO--Bleeding Risk  NO--Other_____    Revised Cardiac Risk Index for Pre-Operative Risk from [employer]  on 4/14/2025  ** All calculations should be rechecked by clinician prior to use **    RESULT SUMMARY:  0 points  RCRI Score    3.9 %  Risk of major cardiac event      INPUTS:  High-risk surgery —> 0 = No  History of ischemic heart disease —> 0 = No  History of congestive heart failure —> 0 = No  History of cerebrovascular disease —> 0 = No  Pre-operative treatment with insulin —> 0 = No  Pre-operative creatinine >2 mg/dL / 176.8 µmol/L —> 0 = No    Duke Activity Status Index (DASI)   RESULT SUMMARY:  38.2 points  The higher the score (maximum 58.2), the higher the functional status.    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 She reports pain in her left foot that has been worsening. She reports pain is worse with ambulation and when she wears certain shoes. She has tried other conservative methods to address the pain but denies any relief. She denies any other concerns. Now proceeding with above.    Denies any chest pain, difficulty breathing, SOB, palpitations, dysuria, URI, or any other infections in the last 2 weeks. Denies any suicidal or homicidal ideations. SLICK reviewed with patient.     Endorses this is their full medical & surgical history including medications prescribed. Pt verbalized understanding of all pre-operative instructions and was given the opportunity to ask questions and have them answered. They were instructed to follow up with their surgeon/proceduralists office with any additional questions regarding their procedure.    Anesthesia Alert  NO--Difficult Airway  NO--History of neck surgery or radiation  NO--Limited ROM of neck  NO--History of Malignant hyperthermia  NO--No personal or family history of Pseudocholinesterase deficiency.  NO--Prior Anesthesia Complication  NO--Latex Allergy  NO--Loose teeth  NO--History of Rheumatoid Arthritis  NO--SLICK  NO--Bleeding Risk  NO--Other_____    Revised Cardiac Risk Index for Pre-Operative Risk   RESULT SUMMARY:  0 points  RCRI Score    3.9 %  Risk of major cardiac event      INPUTS:  High-risk surgery —> 0 = No  History of ischemic heart disease —> 0 = No  History of congestive heart failure —> 0 = No  History of cerebrovascular disease —> 0 = No  Pre-operative treatment with insulin —> 0 = No  Pre-operative creatinine >2 mg/dL / 176.8 µmol/L —> 0 = No    Duke Activity Status Index (DASI)   RESULT SUMMARY:  38.2 points  The higher the score (maximum 58.2), the higher the functional status.    7.44 METs    INPUTS:  Take care of self —> 2.75 = Yes  Walk indoors —> 1.75 = Yes  Walk 1&ndash;2 blocks on level ground —> 2.75 = Yes  Climb a flight of stairs or walk up a hill —> 5.5 = Yes  Run a short distance —> 0 = No  Do light work around the house —> 2.7 = Yes  Do moderate work around the house —> 3.5 = Yes  Do heavy work around the house —> 8 = Yes  Do yardwork —> 0 = No  Have sexual relations —> 5.25 = Yes  Participate in moderate recreational activities —> 6 = Yes  Participate in strenuous sports —> 0 = No
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
## 2025-04-15 DIAGNOSIS — M24.575 CONTRACTURE, LEFT FOOT: ICD-10-CM

## 2025-04-15 DIAGNOSIS — Z01.818 ENCOUNTER FOR OTHER PREPROCEDURAL EXAMINATION: ICD-10-CM

## 2025-04-17 ENCOUNTER — EMERGENCY (EMERGENCY)
Facility: HOSPITAL | Age: 65
LOS: 0 days | Discharge: HOME | End: 2025-04-17
Attending: EMERGENCY MEDICINE
Payer: MEDICAID

## 2025-04-17 VITALS
RESPIRATION RATE: 17 BRPM | WEIGHT: 205.03 LBS | DIASTOLIC BLOOD PRESSURE: 78 MMHG | OXYGEN SATURATION: 97 % | TEMPERATURE: 98 F | HEART RATE: 85 BPM | HEIGHT: 67 IN | SYSTOLIC BLOOD PRESSURE: 118 MMHG

## 2025-04-17 DIAGNOSIS — Z79.85 LONG-TERM (CURRENT) USE OF INJECTABLE NON-INSULIN ANTIDIABETIC DRUGS: ICD-10-CM

## 2025-04-17 DIAGNOSIS — Z88.0 ALLERGY STATUS TO PENICILLIN: ICD-10-CM

## 2025-04-17 DIAGNOSIS — E03.9 HYPOTHYROIDISM, UNSPECIFIED: ICD-10-CM

## 2025-04-17 DIAGNOSIS — E11.9 TYPE 2 DIABETES MELLITUS WITHOUT COMPLICATIONS: ICD-10-CM

## 2025-04-17 DIAGNOSIS — Z90.89 ACQUIRED ABSENCE OF OTHER ORGANS: Chronic | ICD-10-CM

## 2025-04-17 DIAGNOSIS — Z79.84 LONG TERM (CURRENT) USE OF ORAL HYPOGLYCEMIC DRUGS: ICD-10-CM

## 2025-04-17 DIAGNOSIS — R21 RASH AND OTHER NONSPECIFIC SKIN ERUPTION: ICD-10-CM

## 2025-04-17 DIAGNOSIS — E78.5 HYPERLIPIDEMIA, UNSPECIFIED: ICD-10-CM

## 2025-04-17 DIAGNOSIS — Z98.890 OTHER SPECIFIED POSTPROCEDURAL STATES: Chronic | ICD-10-CM

## 2025-04-17 DIAGNOSIS — I10 ESSENTIAL (PRIMARY) HYPERTENSION: ICD-10-CM

## 2025-04-17 LAB
APPEARANCE UR: CLEAR — SIGNIFICANT CHANGE UP
BILIRUB UR-MCNC: NEGATIVE — SIGNIFICANT CHANGE UP
COLOR SPEC: YELLOW — SIGNIFICANT CHANGE UP
DIFF PNL FLD: NEGATIVE — SIGNIFICANT CHANGE UP
GLUCOSE UR QL: NEGATIVE MG/DL — SIGNIFICANT CHANGE UP
KETONES UR-MCNC: 15 MG/DL
LEUKOCYTE ESTERASE UR-ACNC: ABNORMAL
NITRITE UR-MCNC: NEGATIVE — SIGNIFICANT CHANGE UP
PH UR: 6 — SIGNIFICANT CHANGE UP (ref 5–8)
PROT UR-MCNC: NEGATIVE MG/DL — SIGNIFICANT CHANGE UP
SP GR SPEC: 1.01 — SIGNIFICANT CHANGE UP (ref 1–1.03)
UROBILINOGEN FLD QL: 0.2 MG/DL — SIGNIFICANT CHANGE UP (ref 0.2–1)

## 2025-04-17 PROCEDURE — 81001 URINALYSIS AUTO W/SCOPE: CPT

## 2025-04-17 PROCEDURE — 87186 SC STD MICRODIL/AGAR DIL: CPT

## 2025-04-17 PROCEDURE — 87086 URINE CULTURE/COLONY COUNT: CPT

## 2025-04-17 PROCEDURE — 82962 GLUCOSE BLOOD TEST: CPT

## 2025-04-17 PROCEDURE — 76882 US LMTD JT/FCL EVL NVASC XTR: CPT | Mod: LT

## 2025-04-17 PROCEDURE — 99283 EMERGENCY DEPT VISIT LOW MDM: CPT

## 2025-04-17 PROCEDURE — 99284 EMERGENCY DEPT VISIT MOD MDM: CPT

## 2025-04-17 PROCEDURE — 87077 CULTURE AEROBIC IDENTIFY: CPT

## 2025-04-17 RX ORDER — HYDROCORTISONE 10 MG/G
1 CREAM TOPICAL
Qty: 1 | Refills: 0
Start: 2025-04-17 | End: 2025-05-07

## 2025-04-17 RX ORDER — CLOTRIMAZOLE 1 G/100G
1 CREAM TOPICAL
Qty: 1 | Refills: 0
Start: 2025-04-17 | End: 2025-05-07

## 2025-04-17 RX ORDER — HYDROCORTISONE 10 MG/G
1 CREAM TOPICAL
Qty: 1 | Refills: 0
Start: 2025-04-17 | End: 2025-04-30

## 2025-04-17 RX ORDER — CLOTRIMAZOLE 1 G/100G
1 CREAM TOPICAL
Qty: 1 | Refills: 1
Start: 2025-04-17 | End: 2025-07-09

## 2025-04-17 NOTE — ED ADULT NURSE NOTE - FINAL NURSING ELECTRONIC SIGNATURE
Pharmacist chart review completed for refill of rinvoq indicates no medication or significant health changes since last pharmacist counseling. No questions for the pharmacist. Medication shipped to home via [employer] for delivery on 1/16/21.     Ami Paris[facility]   Specialty Pharmacist  [facility]  Phone: [phone]  [email or web address]
17-Apr-2025 12:39

## 2025-04-17 NOTE — ED PROVIDER NOTE - ATTENDING CONTRIBUTION TO CARE
Patient reports rash over her axilla and over the pannus of her abdomen.  It is not itchy but scaly in nature.  On exam there is a scaly texture to the rash without any erythema with some moist skin but nontender to touch.  Impression is candidal infection start on antifungals hydrocortisone will obtain fingerstick and UA

## 2025-04-17 NOTE — ED ADULT TRIAGE NOTE - CHIEF COMPLAINT QUOTE
Patient started ozempic, metformin and farsiga march 10th complaining of rash under stomach fold, under bilateral arms

## 2025-04-17 NOTE — ED PROVIDER NOTE - CLINICAL SUMMARY MEDICAL DECISION MAKING FREE TEXT BOX
Patient evaluated for rash consistent with candidal infection started on antifungals fingerstick normal UA negative patient to follow-up with primary care for further management as needed

## 2025-04-17 NOTE — ED PROVIDER NOTE - PATIENT PORTAL LINK FT
You can access the FollowMyHealth Patient Portal offered by [facility] by registering at the following website: [email or web address]. By joining [facility]’s FollowMyHealth portal, you will also be able to view your health information using other applications (apps) compatible with our system.

## 2025-04-17 NOTE — ED PROVIDER NOTE - PHYSICAL EXAMINATION
General: NAD  Head: AT, NC.  Eyes: EOMI  Heart: RRR  Lungs: Adventitious breath sounds.  Abdomen: Soft, nontender. No CVA tenderness. Redness at skin crease, scaly. Raised, delineated.   Armpits: Similar rash as above, L > R. Scaly. No LN invovlement.

## 2025-04-17 NOTE — ED ADULT NURSE NOTE - NSFALLUNIVINTERV_ED_ALL_ED
Bed/Stretcher in lowest position, wheels locked, appropriate side rails in place/Call bell, personal items and telephone in reach/Instruct patient to call for assistance before getting out of bed/chair/stretcher/Non-slip footwear applied when patient is off stretcher/[place] to call system/Physically safe environment - no spills, clutter or unnecessary equipment/Purposeful proactive rounding/Room/bathroom lighting operational, light cord in reach

## 2025-04-17 NOTE — ED PROVIDER NOTE - NSFOLLOWUPINSTRUCTIONS_ED_ALL_ED_FT
Rash, Adult  Heat rash on a person's hand.   A rash is a breakout of spots or blotches on the skin. It can affect the way the skin looks and feels. Many things can cause a rash. Common causes include:  Viral infections. These include colds, measles, and hand, foot, and mouth disease.  Bacterial infections. These include scarlet fever and impetigo.  Fungal infections. These include athlete's foot, ringworm, and yeast rashes.  Skin irritation. This may be from heat rash, exposure to moisture or friction for a long time (intertrigo), or exposure to soap or skin care products (eczema).  Allergic reactions. These may be caused by foods, medicines, or things like poison ivy.  Some rashes may go away after a few days. Others may last for a few weeks. The goal of treatment is to stop the itching and keep the rash from spreading.    Follow these instructions at home:  Medicine    A tube of ointment.  Take or apply over-the-counter and prescription medicines only as told by your health care provider. These may include:  Corticosteroids. These can help treat red or swollen skin. They may be given as creams or as medicines to take by mouth (orally).  Anti-itch lotions.  Allergy medicines.  Pain medicine.  Antifungal medicine if the rash is from a fungal infection.  Antibiotics if you have an infection.  Skin care    Apply cool, wet cloths (compresses) to the affected areas.  Do not scratch or rub your skin.  Avoid covering the rash. Keep it exposed to air as often as you can.  Managing itching and discomfort    Avoid hot showers and baths. These can make itching worse. A cold shower may help.  Try taking a bath with:  Epsom salts. You can get these at your local pharmacy or grocery store. Follow the instructions on the package.  Baking soda. Pour a small amount into the bath as told by your provider.  Colloidal oatmeal. You can get this at your local pharmacy or grocery store. Follow the instructions on the package.  Try putting baking soda paste on your skin. Stir water into baking soda until it becomes like a paste.  Try using calamine lotion or cortisone cream to help with itchiness.  Keep cool. Stay out of the sun. Sweating and being hot can make itching worse.  General instructions    A person writing in a journal.  Rest as needed.  Drink enough fluid to keep your pee (urine) pale yellow.  Wear loose-fitting clothes.  Avoid scented soaps, detergents, and perfumes. Use gentle soaps, detergents, perfumes, and cosmetics.  Avoid the things that cause your rash (triggers). Keep a journal to help keep track of your triggers. Write down:  What you eat.  What cosmetics you use.  What you drink.  What you wear. This includes jewelry.  Contact a health care provider if:  You sweat at night more than normal.  You pee (urinate) more or less than normal, or your pee is a darker color than normal.  Your eyes become sensitive to light.  Your skin or the white parts of your eyes turn yellow (jaundice).  Your skin tingles or is numb.  You get painful blisters in your nose or mouth.  Your rash does not go away after a few days, or it gets worse.  You are more tired or thirsty than normal.  You have new or worse symptoms. These may include:  Pain in your abdomen.  Fever.  Diarrhea or vomiting.  Weakness or weight loss.  Get help right away if:  You get confused.  You have a severe headache, a stiff neck, or severe joint pain or stiffness.  You become very sleepy or not responsive.  You have a seizure.  This information is not intended to replace advice given to you by your health care provider. Make sure you discuss any questions you have with your health care provider. Please follow-up with your PCP for management of the rash.  Please use the clotrimazole for 6 weeks.  Please use the hydrocortisone for 2 weeks.  Please put the clotrimazole on first followed by the hydrocortisone.  Keep the area dry and clean.    Rash, Adult  Heat rash on a person's hand.   A rash is a breakout of spots or blotches on the skin. It can affect the way the skin looks and feels. Many things can cause a rash. Common causes include:  Viral infections. These include colds, measles, and hand, foot, and mouth disease.  Bacterial infections. These include scarlet fever and impetigo.  Fungal infections. These include athlete's foot, ringworm, and yeast rashes.  Skin irritation. This may be from heat rash, exposure to moisture or friction for a long time (intertrigo), or exposure to soap or skin care products (eczema).  Allergic reactions. These may be caused by foods, medicines, or things like poison ivy.  Some rashes may go away after a few days. Others may last for a few weeks. The goal of treatment is to stop the itching and keep the rash from spreading.    Follow these instructions at home:  Medicine    A tube of ointment.  Take or apply over-the-counter and prescription medicines only as told by your health care provider. These may include:  Corticosteroids. These can help treat red or swollen skin. They may be given as creams or as medicines to take by mouth (orally).  Anti-itch lotions.  Allergy medicines.  Pain medicine.  Antifungal medicine if the rash is from a fungal infection.  Antibiotics if you have an infection.  Skin care    Apply cool, wet cloths (compresses) to the affected areas.  Do not scratch or rub your skin.  Avoid covering the rash. Keep it exposed to air as often as you can.  Managing itching and discomfort    Avoid hot showers and baths. These can make itching worse. A cold shower may help.  Try taking a bath with:  Epsom salts. You can get these at your local pharmacy or grocery store. Follow the instructions on the package.  Baking soda. Pour a small amount into the bath as told by your provider.  Colloidal oatmeal. You can get this at your local pharmacy or grocery store. Follow the instructions on the package.  Try putting baking soda paste on your skin. Stir water into baking soda until it becomes like a paste.  Try using calamine lotion or cortisone cream to help with itchiness.  Keep cool. Stay out of the sun. Sweating and being hot can make itching worse.  General instructions    A person writing in a journal.  Rest as needed.  Drink enough fluid to keep your pee (urine) pale yellow.  Wear loose-fitting clothes.  Avoid scented soaps, detergents, and perfumes. Use gentle soaps, detergents, perfumes, and cosmetics.  Avoid the things that cause your rash (triggers). Keep a journal to help keep track of your triggers. Write down:  What you eat.  What cosmetics you use.  What you drink.  What you wear. This includes jewelry.  Contact a health care provider if:  You sweat at night more than normal.  You pee (urinate) more or less than normal, or your pee is a darker color than normal.  Your eyes become sensitive to light.  Your skin or the white parts of your eyes turn yellow (jaundice).  Your skin tingles or is numb.  You get painful blisters in your nose or mouth.  Your rash does not go away after a few days, or it gets worse.  You are more tired or thirsty than normal.  You have new or worse symptoms. These may include:  Pain in your abdomen.  Fever.  Diarrhea or vomiting.  Weakness or weight loss.  Get help right away if:  You get confused.  You have a severe headache, a stiff neck, or severe joint pain or stiffness.  You become very sleepy or not responsive.  You have a seizure.  This information is not intended to replace advice given to you by your health care provider. Make sure you discuss any questions you have with your health care provider.

## 2025-04-18 ENCOUNTER — OUTPATIENT (OUTPATIENT)
Dept: OUTPATIENT SERVICES | Facility: HOSPITAL | Age: 65
LOS: 1 days | End: 2025-04-18
Payer: MEDICAID

## 2025-04-18 DIAGNOSIS — Z98.890 OTHER SPECIFIED POSTPROCEDURAL STATES: Chronic | ICD-10-CM

## 2025-04-18 DIAGNOSIS — Z90.89 ACQUIRED ABSENCE OF OTHER ORGANS: Chronic | ICD-10-CM

## 2025-04-18 DIAGNOSIS — N64.59 OTHER SIGNS AND SYMPTOMS IN BREAST: ICD-10-CM

## 2025-04-18 DIAGNOSIS — Z12.31 ENCOUNTER FOR SCREENING MAMMOGRAM FOR MALIGNANT NEOPLASM OF BREAST: ICD-10-CM

## 2025-04-18 DIAGNOSIS — R92.2 INCONCLUSIVE MAMMOGRAM: ICD-10-CM

## 2025-04-18 PROCEDURE — 77062 BREAST TOMOSYNTHESIS BI: CPT | Mod: 26

## 2025-04-18 PROCEDURE — 76642 ULTRASOUND BREAST LIMITED: CPT | Mod: 50

## 2025-04-18 PROCEDURE — G0279: CPT

## 2025-04-18 PROCEDURE — 76642 ULTRASOUND BREAST LIMITED: CPT | Mod: 26,50

## 2025-04-18 PROCEDURE — 77066 DX MAMMO INCL CAD BI: CPT

## 2025-04-18 PROCEDURE — 77066 DX MAMMO INCL CAD BI: CPT | Mod: 26

## 2025-04-19 DIAGNOSIS — N64.59 OTHER SIGNS AND SYMPTOMS IN BREAST: ICD-10-CM

## 2025-04-19 RX ORDER — NITROFURANTOIN MACROCRYSTAL 100 MG
1 CAPSULE ORAL
Qty: 14 | Refills: 0
Start: 2025-04-19 | End: 2025-04-25

## 2025-05-01 ENCOUNTER — OUTPATIENT (OUTPATIENT)
Dept: OUTPATIENT SERVICES | Facility: HOSPITAL | Age: 65
LOS: 1 days | Discharge: HOME | End: 2025-05-01
Payer: MEDICAID

## 2025-05-01 ENCOUNTER — RESULT REVIEW (OUTPATIENT)
Age: 65
End: 2025-05-01

## 2025-05-01 ENCOUNTER — TRANSCRIPTION ENCOUNTER (OUTPATIENT)
Age: 65
End: 2025-05-01

## 2025-05-01 VITALS
WEIGHT: 197.09 LBS | SYSTOLIC BLOOD PRESSURE: 117 MMHG | OXYGEN SATURATION: 96 % | HEIGHT: 67 IN | RESPIRATION RATE: 17 BRPM | HEART RATE: 96 BPM | DIASTOLIC BLOOD PRESSURE: 80 MMHG | TEMPERATURE: 97 F

## 2025-05-01 VITALS — SYSTOLIC BLOOD PRESSURE: 100 MMHG | DIASTOLIC BLOOD PRESSURE: 60 MMHG | HEART RATE: 90 BPM | RESPIRATION RATE: 16 BRPM

## 2025-05-01 DIAGNOSIS — E78.00 PURE HYPERCHOLESTEROLEMIA, UNSPECIFIED: ICD-10-CM

## 2025-05-01 DIAGNOSIS — Z98.890 OTHER SPECIFIED POSTPROCEDURAL STATES: Chronic | ICD-10-CM

## 2025-05-01 DIAGNOSIS — Z88.0 ALLERGY STATUS TO PENICILLIN: ICD-10-CM

## 2025-05-01 DIAGNOSIS — Z90.89 ACQUIRED ABSENCE OF OTHER ORGANS: Chronic | ICD-10-CM

## 2025-05-01 DIAGNOSIS — M24.575 CONTRACTURE, LEFT FOOT: ICD-10-CM

## 2025-05-01 DIAGNOSIS — I10 ESSENTIAL (PRIMARY) HYPERTENSION: ICD-10-CM

## 2025-05-01 DIAGNOSIS — Z79.84 LONG TERM (CURRENT) USE OF ORAL HYPOGLYCEMIC DRUGS: ICD-10-CM

## 2025-05-01 DIAGNOSIS — Z79.85 LONG-TERM (CURRENT) USE OF INJECTABLE NON-INSULIN ANTIDIABETIC DRUGS: ICD-10-CM

## 2025-05-01 DIAGNOSIS — E03.9 HYPOTHYROIDISM, UNSPECIFIED: ICD-10-CM

## 2025-05-01 DIAGNOSIS — Z87.891 PERSONAL HISTORY OF NICOTINE DEPENDENCE: ICD-10-CM

## 2025-05-01 LAB — GLUCOSE BLDC GLUCOMTR-MCNC: 116 MG/DL — HIGH (ref 70–99)

## 2025-05-01 PROCEDURE — C1734: CPT

## 2025-05-01 PROCEDURE — C9399: CPT

## 2025-05-01 PROCEDURE — 73630 X-RAY EXAM OF FOOT: CPT | Mod: LT

## 2025-05-01 PROCEDURE — C1713: CPT

## 2025-05-01 PROCEDURE — C1889: CPT

## 2025-05-01 PROCEDURE — 73630 X-RAY EXAM OF FOOT: CPT | Mod: 26,LT

## 2025-05-01 PROCEDURE — 82962 GLUCOSE BLOOD TEST: CPT

## 2025-05-01 RX ORDER — SEMAGLUTIDE 1 MG/.5ML
0 INJECTION, SOLUTION SUBCUTANEOUS
Refills: 0 | DISCHARGE

## 2025-05-01 RX ORDER — LEVOTHYROXINE SODIUM 300 MCG
1 TABLET ORAL
Refills: 0 | DISCHARGE

## 2025-05-01 RX ORDER — ONDANSETRON HCL/PF 4 MG/2 ML
4 VIAL (ML) INJECTION ONCE
Refills: 0 | Status: DISCONTINUED | OUTPATIENT
Start: 2025-05-01 | End: 2025-05-01

## 2025-05-01 RX ORDER — HYDROMORPHONE/SOD CHLOR,ISO/PF 2 MG/10 ML
0.5 SYRINGE (ML) INJECTION
Refills: 0 | Status: DISCONTINUED | OUTPATIENT
Start: 2025-05-01 | End: 2025-05-01

## 2025-05-01 RX ORDER — METFORMIN HYDROCHLORIDE 850 MG/1
1 TABLET ORAL
Refills: 0 | DISCHARGE

## 2025-05-01 RX ORDER — DAPAGLIFLOZIN 5 MG/1
1 TABLET, FILM COATED ORAL
Refills: 0 | DISCHARGE

## 2025-05-01 RX ORDER — HYDROMORPHONE/SOD CHLOR,ISO/PF 2 MG/10 ML
1 SYRINGE (ML) INJECTION
Refills: 0 | Status: DISCONTINUED | OUTPATIENT
Start: 2025-05-01 | End: 2025-05-01

## 2025-05-01 RX ORDER — SODIUM CHLORIDE 9 G/1000ML
1000 INJECTION, SOLUTION INTRAVENOUS
Refills: 0 | Status: DISCONTINUED | OUTPATIENT
Start: 2025-05-01 | End: 2025-05-01

## 2025-05-01 NOTE — ASU DISCHARGE PLAN (ADULT/PEDIATRIC) - PROCEDURE
Cuboid osteotomy, Medial cuneiform osteotomy, Fusion of multiple tarsometatarsal joints, hardware removal

## 2025-05-01 NOTE — BRIEF OPERATIVE NOTE - OPERATION/FINDINGS
Excellent compression across fusion of tarsometatarsal joints, improved metatarsus adductus angle, excellent compression across osteotomy of cuboid

## 2025-05-01 NOTE — BRIEF OPERATIVE NOTE - NSICDXBRIEFPROCEDURE_GEN_ALL_CORE_FT
PROCEDURES:  Osteotomy, medial cuneiform 01-May-2025 12:41:42 with wedge placement Abbie Lopez  Osteotomy, cuboid and cuneiform 01-May-2025 12:42:01  Abbie Lopez  Fusion of multiple tarsometatarsal joints 01-May-2025 12:42:59 second and third Abbie Lopez  Removal, hardware, extremity 01-May-2025 12:43:15 screw x1 Abbie Lopez

## 2025-05-01 NOTE — ASU PATIENT PROFILE, ADULT - FALL HARM RISK - RISK INTERVENTIONS

## 2025-05-01 NOTE — ASU DISCHARGE PLAN (ADULT/PEDIATRIC) - FINANCIAL ASSISTANCE
[facility] provides services at a reduced cost to those who are determined to be eligible through [facility]’s financial assistance program. Information regarding [facility]’s financial assistance program can be found by going to [email or web address] or by calling [phone].

## 2025-05-01 NOTE — ASU DISCHARGE PLAN (ADULT/PEDIATRIC) - CARE PROVIDER_API CALL
Hernandez Malone  Podiatric Medicine  [address] [place] [id number]  Phone: [phone]  Fax: [phone]  Established Patient  Follow Up Time: 1 week

## 2025-05-01 NOTE — CHART NOTE - NSCHARTNOTEFT_GEN_A_CORE
PACU ANESTHESIA ADMISSION NOTE      Procedure: Osteotomy, medial cuneiform  with wedge placement    Osteotomy, cuboid and cuneiform    Fusion of second tarsometatarsal joint of left foot    Osteotomy with fusion, multiple tarsometatarsal joints    Fusion of multiple tarsometatarsal joints  second and third    Removal, hardware, extremity  screw x1      Post op diagnosis:  Contracture, left foot      __x__  Patent Airway    __x__  Full return of protective reflexes    __x__  Full recovery from anesthesia / back to baseline status    Vitals:  T(C): 36.2 (05-01-25 @ 09:34), Max: 36.2 (05-01-25 @ 07:47)  HR: 96 (05-01-25 @ 09:34) (96 - 96)  BP: 117/80 (05-01-25 @ 09:34) (117/80 - 117/80)  RR: 17 (05-01-25 @ 09:34) (17 - 17)  SpO2: 96% (05-01-25 @ 09:34) (96% - 96%)    Mental Status:  __x__ Awake   ___x__ Alert   _____ Drowsy   _____ Sedated    Nausea/Vomiting:  __x__ NO  ______Yes,   See Post - Op Orders          Pain Scale (0-10):  ___0__    Treatment: ____ None    __x__ See Post - Op/PCA Orders    Post - Operative Fluids:   ____ Oral   __x__ See Post - Op Orders    Plan: Discharge:   _x___Home       _____Floor     _____Critical Care    _____  Other:_________________    Comments: Patient had smooth intraoperative event, no anesthesia complication.  PACU Vital signs: HR: 99            BP:        106/56          RR:  16           O2 Sat:       95%

## 2025-06-03 ENCOUNTER — APPOINTMENT (OUTPATIENT)
Dept: BREAST CENTER | Facility: CLINIC | Age: 65
End: 2025-06-03

## 2025-06-12 NOTE — ASU DISCHARGE PLAN (ADULT/PEDIATRIC) - ASU DISCHARGE DATE/TIME
CONSTITUTIONAL: NAD  SKIN: Warm dry  HEAD: NCAT  EYES: NL inspection  ENT: MMM  NECK: Supple; non tender.  CARD: RRR  RESP: No respiratory distress, lung sounds clear bilaterally  ABD: S/NT no R/G  EXT: pedal edema without discharge, no calf tenderness  NEURO: Grossly unremarkable  PSYCH: Cooperative, appropriate.
13-Dec-2022 16:44